# Patient Record
Sex: MALE | Race: WHITE | Employment: FULL TIME | URBAN - NONMETROPOLITAN AREA
[De-identification: names, ages, dates, MRNs, and addresses within clinical notes are randomized per-mention and may not be internally consistent; named-entity substitution may affect disease eponyms.]

---

## 2017-01-16 DIAGNOSIS — E78.00 PURE HYPERCHOLESTEROLEMIA: ICD-10-CM

## 2017-01-16 RX ORDER — FENOFIBRATE 145 MG/1
TABLET, COATED ORAL
Qty: 90 TABLET | Refills: 1 | Status: SHIPPED | OUTPATIENT
Start: 2017-01-16 | End: 2017-08-31 | Stop reason: SDUPTHER

## 2017-04-25 ENCOUNTER — HOSPITAL ENCOUNTER (OUTPATIENT)
Age: 47
Discharge: HOME | End: 2017-04-25
Payer: COMMERCIAL

## 2017-04-25 LAB — PROSTATE SPECIFIC ANTIGEN: 0.31 UG/L

## 2017-04-25 PROCEDURE — 36415 COLL VENOUS BLD VENIPUNCTURE: CPT

## 2017-04-25 PROCEDURE — 84153 ASSAY OF PSA TOTAL: CPT

## 2017-06-26 ENCOUNTER — TELEPHONE (OUTPATIENT)
Dept: FAMILY MEDICINE CLINIC | Age: 47
End: 2017-06-26

## 2017-06-26 DIAGNOSIS — E78.00 PURE HYPERCHOLESTEROLEMIA: Primary | ICD-10-CM

## 2017-08-24 ENCOUNTER — HOSPITAL ENCOUNTER (OUTPATIENT)
Age: 47
Discharge: HOME | End: 2017-08-24
Payer: COMMERCIAL

## 2017-08-24 DIAGNOSIS — E78.00 PURE HYPERCHOLESTEROLEMIA: ICD-10-CM

## 2017-08-24 LAB
ALBUMIN SERPL-MCNC: 4.5 G/DL (ref 3.5–5.2)
ALBUMIN/GLOBULIN RATIO: ABNORMAL (ref 1–2.5)
ALP BLD-CCNC: 52 U/L (ref 40–129)
ALT SERPL-CCNC: 34 U/L (ref 5–41)
ANION GAP SERPL CALCULATED.3IONS-SCNC: 13 MMOL/L (ref 9–17)
AST SERPL-CCNC: 23 U/L
BILIRUB SERPL-MCNC: 0.35 MG/DL (ref 0.3–1.2)
BUN BLDV-MCNC: 24 MG/DL (ref 6–20)
BUN/CREAT BLD: 27 (ref 9–20)
CALCIUM SERPL-MCNC: 9 MG/DL (ref 8.6–10.4)
CHLORIDE BLD-SCNC: 109 MMOL/L (ref 98–107)
CHOLESTEROL/HDL RATIO: 2.8
CHOLESTEROL: 207 MG/DL
CO2: 20 MMOL/L (ref 20–31)
CREAT SERPL-MCNC: 0.89 MG/DL (ref 0.7–1.2)
GFR AFRICAN AMERICAN: >60 ML/MIN
GFR NON-AFRICAN AMERICAN: >60 ML/MIN
GFR SERPL CREATININE-BSD FRML MDRD: ABNORMAL ML/MIN/{1.73_M2}
GFR SERPL CREATININE-BSD FRML MDRD: ABNORMAL ML/MIN/{1.73_M2}
GLUCOSE BLD-MCNC: 96 MG/DL (ref 70–99)
HDLC SERPL-MCNC: 73 MG/DL
LDL CHOLESTEROL: 107 MG/DL (ref 0–130)
PATIENT FASTING?: YES
POTASSIUM SERPL-SCNC: 4 MMOL/L (ref 3.7–5.3)
SODIUM BLD-SCNC: 142 MMOL/L (ref 135–144)
TOTAL PROTEIN: 7.6 G/DL (ref 6.4–8.3)
TRIGL SERPL-MCNC: 133 MG/DL
VLDLC SERPL CALC-MCNC: ABNORMAL MG/DL (ref 1–30)

## 2017-08-24 PROCEDURE — 80061 LIPID PANEL: CPT

## 2017-08-24 PROCEDURE — 80053 COMPREHEN METABOLIC PANEL: CPT

## 2017-08-24 PROCEDURE — 36415 COLL VENOUS BLD VENIPUNCTURE: CPT

## 2017-08-31 ENCOUNTER — OFFICE VISIT (OUTPATIENT)
Dept: FAMILY MEDICINE CLINIC | Age: 47
End: 2017-08-31
Payer: COMMERCIAL

## 2017-08-31 VITALS
WEIGHT: 174 LBS | BODY MASS INDEX: 26.37 KG/M2 | DIASTOLIC BLOOD PRESSURE: 80 MMHG | SYSTOLIC BLOOD PRESSURE: 130 MMHG | HEIGHT: 68 IN

## 2017-08-31 DIAGNOSIS — K21.9 GASTROESOPHAGEAL REFLUX DISEASE WITHOUT ESOPHAGITIS: ICD-10-CM

## 2017-08-31 DIAGNOSIS — E78.00 PURE HYPERCHOLESTEROLEMIA: ICD-10-CM

## 2017-08-31 PROCEDURE — G8427 DOCREV CUR MEDS BY ELIG CLIN: HCPCS | Performed by: FAMILY MEDICINE

## 2017-08-31 PROCEDURE — 1036F TOBACCO NON-USER: CPT | Performed by: FAMILY MEDICINE

## 2017-08-31 PROCEDURE — 99213 OFFICE O/P EST LOW 20 MIN: CPT | Performed by: FAMILY MEDICINE

## 2017-08-31 PROCEDURE — G8419 CALC BMI OUT NRM PARAM NOF/U: HCPCS | Performed by: FAMILY MEDICINE

## 2017-08-31 RX ORDER — LANSOPRAZOLE 30 MG/1
30 CAPSULE, DELAYED RELEASE ORAL DAILY
Qty: 90 CAPSULE | Refills: 1 | Status: SHIPPED | OUTPATIENT
Start: 2017-08-31 | End: 2018-03-01 | Stop reason: SDUPTHER

## 2017-08-31 RX ORDER — FENOFIBRATE 145 MG/1
TABLET, COATED ORAL
Qty: 90 TABLET | Refills: 1 | Status: SHIPPED | OUTPATIENT
Start: 2017-08-31 | End: 2018-03-01 | Stop reason: SDUPTHER

## 2017-08-31 ASSESSMENT — ENCOUNTER SYMPTOMS
CHOKING: 0
COUGH: 0
VOMITING: 0
BELCHING: 0
HEARTBURN: 0
EYE DISCHARGE: 0
NAUSEA: 0
EYE REDNESS: 0
FACIAL SWELLING: 0

## 2018-03-01 ENCOUNTER — OFFICE VISIT (OUTPATIENT)
Dept: FAMILY MEDICINE CLINIC | Age: 48
End: 2018-03-01
Payer: COMMERCIAL

## 2018-03-01 VITALS
DIASTOLIC BLOOD PRESSURE: 84 MMHG | HEIGHT: 69 IN | OXYGEN SATURATION: 98 % | BODY MASS INDEX: 26.81 KG/M2 | HEART RATE: 74 BPM | SYSTOLIC BLOOD PRESSURE: 120 MMHG | WEIGHT: 181 LBS

## 2018-03-01 DIAGNOSIS — E78.00 PURE HYPERCHOLESTEROLEMIA: ICD-10-CM

## 2018-03-01 DIAGNOSIS — K21.9 GASTROESOPHAGEAL REFLUX DISEASE WITHOUT ESOPHAGITIS: ICD-10-CM

## 2018-03-01 PROCEDURE — G8427 DOCREV CUR MEDS BY ELIG CLIN: HCPCS | Performed by: FAMILY MEDICINE

## 2018-03-01 PROCEDURE — G8419 CALC BMI OUT NRM PARAM NOF/U: HCPCS | Performed by: FAMILY MEDICINE

## 2018-03-01 PROCEDURE — 99213 OFFICE O/P EST LOW 20 MIN: CPT | Performed by: FAMILY MEDICINE

## 2018-03-01 PROCEDURE — G8482 FLU IMMUNIZE ORDER/ADMIN: HCPCS | Performed by: FAMILY MEDICINE

## 2018-03-01 PROCEDURE — 1036F TOBACCO NON-USER: CPT | Performed by: FAMILY MEDICINE

## 2018-03-01 RX ORDER — FENOFIBRATE 145 MG/1
TABLET, COATED ORAL
Qty: 90 TABLET | Refills: 3 | Status: SHIPPED | OUTPATIENT
Start: 2018-03-01 | End: 2019-02-14 | Stop reason: SDUPTHER

## 2018-03-01 RX ORDER — LANSOPRAZOLE 30 MG/1
30 CAPSULE, DELAYED RELEASE ORAL DAILY
Qty: 90 CAPSULE | Refills: 3 | Status: SHIPPED | OUTPATIENT
Start: 2018-03-01 | End: 2019-02-14 | Stop reason: SDUPTHER

## 2018-03-01 ASSESSMENT — ENCOUNTER SYMPTOMS
BLOOD IN STOOL: 0
EYE DISCHARGE: 0
EYE REDNESS: 0
COUGH: 0
DIARRHEA: 0
TROUBLE SWALLOWING: 0
ABDOMINAL PAIN: 0
SHORTNESS OF BREATH: 0
VOMITING: 0
NAUSEA: 0
CONSTIPATION: 0

## 2018-03-01 ASSESSMENT — PATIENT HEALTH QUESTIONNAIRE - PHQ9
SUM OF ALL RESPONSES TO PHQ9 QUESTIONS 1 & 2: 0
1. LITTLE INTEREST OR PLEASURE IN DOING THINGS: 0
2. FEELING DOWN, DEPRESSED OR HOPELESS: 0
SUM OF ALL RESPONSES TO PHQ QUESTIONS 1-9: 0

## 2018-03-01 NOTE — PATIENT INSTRUCTIONS
SURVEY:    You may be receiving a survey from [employer] regarding your visit today. Please complete the survey to enable us to provide the highest quality of care to you and your family. If you cannot score us a very good on any question, please call the office to discuss how we could of made your experience a very good one. Thank you.

## 2018-05-22 ENCOUNTER — HOSPITAL ENCOUNTER (OUTPATIENT)
Age: 48
Discharge: HOME | End: 2018-05-22
Payer: COMMERCIAL

## 2018-05-22 LAB — PROSTATE SPECIFIC ANTIGEN: 0.25 UG/L

## 2018-05-22 PROCEDURE — 84153 ASSAY OF PSA TOTAL: CPT

## 2018-05-22 PROCEDURE — 36415 COLL VENOUS BLD VENIPUNCTURE: CPT

## 2018-09-03 ENCOUNTER — HOSPITAL ENCOUNTER (EMERGENCY)
Age: 48
Discharge: HOME | End: 2018-09-03
Attending: FAMILY MEDICINE
Payer: COMMERCIAL

## 2018-09-03 ENCOUNTER — APPOINTMENT (OUTPATIENT)
Dept: CT IMAGING | Age: 48
End: 2018-09-03
Payer: COMMERCIAL

## 2018-09-03 VITALS
HEART RATE: 66 BPM | TEMPERATURE: 98.8 F | BODY MASS INDEX: 25.82 KG/M2 | DIASTOLIC BLOOD PRESSURE: 66 MMHG | RESPIRATION RATE: 14 BRPM | SYSTOLIC BLOOD PRESSURE: 134 MMHG | OXYGEN SATURATION: 99 % | WEIGHT: 174.82 LBS

## 2018-09-03 DIAGNOSIS — R22.0 RIGHT FACIAL SWELLING: ICD-10-CM

## 2018-09-03 DIAGNOSIS — K04.7 DENTAL ABSCESS: Primary | ICD-10-CM

## 2018-09-03 LAB
ABSOLUTE EOS #: 0.1 K/UL (ref 0–0.4)
ABSOLUTE IMMATURE GRANULOCYTE: ABNORMAL K/UL (ref 0–0.3)
ABSOLUTE LYMPH #: 2 K/UL (ref 1–4.8)
ABSOLUTE MONO #: 0.8 K/UL (ref 0–1)
ANION GAP SERPL CALCULATED.3IONS-SCNC: 14 MMOL/L (ref 9–17)
BASOPHILS # BLD: 1 % (ref 0–2)
BASOPHILS ABSOLUTE: 0.1 K/UL (ref 0–0.2)
BUN BLDV-MCNC: 15 MG/DL (ref 6–20)
BUN/CREAT BLD: 18 (ref 9–20)
CALCIUM SERPL-MCNC: 10 MG/DL (ref 8.6–10.4)
CHLORIDE BLD-SCNC: 103 MMOL/L (ref 98–107)
CO2: 24 MMOL/L (ref 20–31)
CREAT SERPL-MCNC: 0.84 MG/DL (ref 0.7–1.2)
DIFFERENTIAL TYPE: YES
EOSINOPHILS RELATIVE PERCENT: 1 % (ref 0–5)
GFR AFRICAN AMERICAN: >60 ML/MIN
GFR NON-AFRICAN AMERICAN: >60 ML/MIN
GFR SERPL CREATININE-BSD FRML MDRD: NORMAL ML/MIN/{1.73_M2}
GFR SERPL CREATININE-BSD FRML MDRD: NORMAL ML/MIN/{1.73_M2}
GLUCOSE BLD-MCNC: 92 MG/DL (ref 70–99)
HCT VFR BLD CALC: 42.2 % (ref 41–53)
HEMOGLOBIN: 14.1 G/DL (ref 13.5–17.5)
IMMATURE GRANULOCYTES: ABNORMAL %
LYMPHOCYTES # BLD: 18 % (ref 13–44)
MCH RBC QN AUTO: 29.2 PG (ref 26–34)
MCHC RBC AUTO-ENTMCNC: 33.3 G/DL (ref 31–37)
MCV RBC AUTO: 87.9 FL (ref 80–100)
MONOCYTES # BLD: 7 % (ref 5–9)
NRBC AUTOMATED: ABNORMAL PER 100 WBC
PDW BLD-RTO: 14.1 % (ref 12.1–15.2)
PLATELET # BLD: 378 K/UL (ref 140–450)
PLATELET ESTIMATE: ABNORMAL
PMV BLD AUTO: ABNORMAL FL (ref 6–12)
POTASSIUM SERPL-SCNC: 3.9 MMOL/L (ref 3.7–5.3)
RBC # BLD: 4.81 M/UL (ref 4.5–5.9)
RBC # BLD: ABNORMAL 10*6/UL
SEG NEUTROPHILS: 73 % (ref 39–75)
SEGMENTED NEUTROPHILS ABSOLUTE COUNT: 8.1 K/UL (ref 2.1–6.5)
SODIUM BLD-SCNC: 141 MMOL/L (ref 135–144)
WBC # BLD: 11.1 K/UL (ref 3.5–11)
WBC # BLD: ABNORMAL 10*3/UL

## 2018-09-03 PROCEDURE — 99283 EMERGENCY DEPT VISIT LOW MDM: CPT

## 2018-09-03 PROCEDURE — 70487 CT MAXILLOFACIAL W/DYE: CPT

## 2018-09-03 PROCEDURE — 85025 COMPLETE CBC W/AUTO DIFF WBC: CPT

## 2018-09-03 PROCEDURE — 80048 BASIC METABOLIC PNL TOTAL CA: CPT

## 2018-09-03 PROCEDURE — 6360000004 HC RX CONTRAST MEDICATION: Performed by: FAMILY MEDICINE

## 2018-09-03 RX ORDER — ACETAMINOPHEN 500 MG
1000 TABLET ORAL EVERY 6 HOURS PRN
COMMUNITY

## 2018-09-03 RX ORDER — CLINDAMYCIN HYDROCHLORIDE 150 MG/1
150 CAPSULE ORAL 3 TIMES DAILY
COMMUNITY
End: 2019-02-14 | Stop reason: ALTCHOICE

## 2018-09-03 RX ORDER — IBUPROFEN 200 MG
800 TABLET ORAL EVERY 6 HOURS PRN
COMMUNITY

## 2018-09-03 RX ADMIN — IOPAMIDOL 75 ML: 755 INJECTION, SOLUTION INTRAVENOUS at 15:51

## 2018-09-03 ASSESSMENT — PAIN DESCRIPTION - DESCRIPTORS: DESCRIPTORS: ACHING

## 2018-09-03 ASSESSMENT — PAIN DESCRIPTION - FREQUENCY: FREQUENCY: CONTINUOUS

## 2018-09-03 ASSESSMENT — PAIN DESCRIPTION - LOCATION: LOCATION: FACE

## 2018-09-03 ASSESSMENT — PAIN DESCRIPTION - PAIN TYPE: TYPE: ACUTE PAIN

## 2018-09-03 ASSESSMENT — PAIN SCALES - GENERAL: PAINLEVEL_OUTOF10: 4

## 2018-09-03 NOTE — LETTER
[facility] ED  [address] [address]  Phone: [phone]               September 3, 2018    Patient: Alberto Hopkins   YOB: 1970   Date of Visit: 9/3/2018       To Whom It May Concern:    James Turner was seen and treated in our emergency department on 9/3/2018. He may return to work on 09/05/2018.       Sincerely,       James Turner RN         Signature:__________________________________

## 2018-09-04 NOTE — ED PROVIDER NOTES
that the status of his mother is unknown. He indicated that the status of his father is unknown. He indicated that the status of his brother is unknown.    family history includes Cancer in his father and mother; Diabetes in his mother; Heart Disease in his father; High Blood Pressure in his mother; High Cholesterol in his brother. SOCIAL HISTORY      reports that he quit smoking about 11 years ago. He has never used smokeless tobacco.    PHYSICAL EXAM     INITIAL VITALS:  weight is 174 lb 13.2 oz (79.3 kg). His temperature is 98.8 °F (37.1 °C). His blood pressure is 134/66 and his pulse is 66. His respiration is 14 and oxygen saturation is 99%. Physical Exam   Constitutional: Patient is oriented to person, place, and time. Patient appears well-developed and well-nourished. Patient is active and cooperative. HENT:   Head: Normocephalic and atraumatic. Head is without contusion. There is noted swelling affecting the patient's right cheek, without overlying integument aberration. Right Ear: Hearing and external ear normal. No drainage. Left Ear: Hearing and external ear normal. No drainage. Nose: Nose normal. No nasal deformity. No epistaxis. Mouth/Throat: Mucous membranes are not dry. Mild tenderness to palpation of the incisor teeth medially, without erythema or gum, no gross abscess  Eyes: EOMI. Conjunctivae, sclera, and lids are normal. Right eye exhibits no discharge. Left eye exhibits no discharge. Neck: Full passive range of motion without pain and phonation normal.   Cardiovascular:  Normal rate, regular rhythm and intact distal pulses. Pulses: Right radial pulse  2+   Pulmonary/Chest: Effort normal. No tachypnea and no bradypnea. Abdominal: Soft. Patient without distension  Musculoskeletal:   Negative acute trauma or deformity,  apparent full range of motion and normal strength all extremities appropriate to age.   Neurological: Patient is alert and oriented to person, place, and Motrin and Tylenol for pain and fever, patient strongly advised to keep his stated appointment with his dentist this evening for definitive treatment of dental abscess, patient acknowledges    FINAL IMPRESSION      1. Dental abscess    2. Right facial swelling          DISPOSITION/PLAN   Discharge    PATIENT REFERRED TO:  Dr. Rosanne Robbins, Dentist  [facility]  [address] [address]  [phone]    If symptoms worsen, As needed      DISCHARGE MEDICATIONS:  Discharge Medication List as of 9/3/2018  4:33 PM              Summation      Patient Course:  Discharge    ED Medications administered this visit:    Medications   iopamidol (ISOVUE-370) 76 % injection 75 mL (75 mLs Intravenous Given 9/3/18 1551)       New Prescriptions from this visit:    Discharge Medication List as of 9/3/2018  4:33 PM          Follow-up:  Dr. Rosanne Robbins, Dentist  Follow up at stated [facility] ED  [address] [address]  [phone]    If symptoms worsen, As needed        Final Impression:   1. Dental abscess    2.  Right facial swelling               (Please note that portions of this note were completed with a voice recognition program.  Efforts were made to edit the dictations but occasionally words are mis-transcribed.)    MD Werner Randle MD  09/04/18 5550

## 2018-10-26 ENCOUNTER — TELEPHONE (OUTPATIENT)
Dept: FAMILY MEDICINE CLINIC | Age: 48
End: 2018-10-26

## 2018-10-26 RX ORDER — PREDNISONE 20 MG/1
TABLET ORAL
Qty: 21 TABLET | Refills: 0 | Status: SHIPPED | OUTPATIENT
Start: 2018-10-26 | End: 2019-02-14 | Stop reason: ALTCHOICE

## 2019-01-09 LAB
CHOLESTEROL, TOTAL: 240 MG/DL
CHOLESTEROL/HDL RATIO: 3
HDLC SERPL-MCNC: 79 MG/DL (ref 35–70)
LDL CHOLESTEROL CALCULATED: 139 MG/DL (ref 0–160)
TRIGL SERPL-MCNC: 112 MG/DL
VLDLC SERPL CALC-MCNC: ABNORMAL MG/DL

## 2019-02-14 ENCOUNTER — OFFICE VISIT (OUTPATIENT)
Dept: FAMILY MEDICINE CLINIC | Age: 49
End: 2019-02-14
Payer: COMMERCIAL

## 2019-02-14 VITALS
OXYGEN SATURATION: 98 % | WEIGHT: 179 LBS | BODY MASS INDEX: 26.43 KG/M2 | SYSTOLIC BLOOD PRESSURE: 124 MMHG | DIASTOLIC BLOOD PRESSURE: 82 MMHG | HEART RATE: 90 BPM

## 2019-02-14 DIAGNOSIS — R06.83 SNORING: ICD-10-CM

## 2019-02-14 DIAGNOSIS — R53.83 OTHER FATIGUE: ICD-10-CM

## 2019-02-14 DIAGNOSIS — K21.9 GASTROESOPHAGEAL REFLUX DISEASE WITHOUT ESOPHAGITIS: ICD-10-CM

## 2019-02-14 DIAGNOSIS — E78.00 PURE HYPERCHOLESTEROLEMIA: Primary | ICD-10-CM

## 2019-02-14 PROCEDURE — 1036F TOBACCO NON-USER: CPT | Performed by: FAMILY MEDICINE

## 2019-02-14 PROCEDURE — 99213 OFFICE O/P EST LOW 20 MIN: CPT | Performed by: FAMILY MEDICINE

## 2019-02-14 PROCEDURE — G8419 CALC BMI OUT NRM PARAM NOF/U: HCPCS | Performed by: FAMILY MEDICINE

## 2019-02-14 PROCEDURE — G8484 FLU IMMUNIZE NO ADMIN: HCPCS | Performed by: FAMILY MEDICINE

## 2019-02-14 PROCEDURE — G8427 DOCREV CUR MEDS BY ELIG CLIN: HCPCS | Performed by: FAMILY MEDICINE

## 2019-02-14 RX ORDER — FENOFIBRATE 145 MG/1
TABLET, COATED ORAL
Qty: 90 TABLET | Refills: 3 | Status: SHIPPED | OUTPATIENT
Start: 2019-02-14 | End: 2020-02-06 | Stop reason: SDUPTHER

## 2019-02-14 RX ORDER — LANSOPRAZOLE 30 MG/1
30 CAPSULE, DELAYED RELEASE ORAL DAILY
Qty: 90 CAPSULE | Refills: 3 | Status: SHIPPED | OUTPATIENT
Start: 2019-02-14 | End: 2020-02-06 | Stop reason: SDUPTHER

## 2019-02-14 ASSESSMENT — ENCOUNTER SYMPTOMS
EYE DISCHARGE: 0
COUGH: 0
VOMITING: 0
HOARSE VOICE: 0
SHORTNESS OF BREATH: 0
DIARRHEA: 0
HEARTBURN: 0
BLOOD IN STOOL: 0
FACIAL SWELLING: 0
EYE REDNESS: 0

## 2019-02-14 ASSESSMENT — PATIENT HEALTH QUESTIONNAIRE - PHQ9
SUM OF ALL RESPONSES TO PHQ QUESTIONS 1-9: 0
2. FEELING DOWN, DEPRESSED OR HOPELESS: 0
SUM OF ALL RESPONSES TO PHQ QUESTIONS 1-9: 0
1. LITTLE INTEREST OR PLEASURE IN DOING THINGS: 0
SUM OF ALL RESPONSES TO PHQ9 QUESTIONS 1 & 2: 0

## 2019-05-10 ENCOUNTER — HOSPITAL ENCOUNTER (OUTPATIENT)
Age: 49
Discharge: HOME | End: 2019-05-10
Payer: COMMERCIAL

## 2019-05-10 PROCEDURE — 36415 COLL VENOUS BLD VENIPUNCTURE: CPT

## 2019-05-10 PROCEDURE — 84154 ASSAY OF PSA FREE: CPT

## 2019-05-14 LAB
PROSTATE SPECIFIC ANTIGEN FREE: <0.1 UG/L
PROSTATE SPECIFIC ANTIGEN PERCENT FREE: 20 %
PROSTATE SPECIFIC ANTIGEN: 0.2 UG/L (ref 0–4)

## 2019-06-19 ENCOUNTER — HOSPITAL ENCOUNTER (OUTPATIENT)
Dept: GENERAL RADIOLOGY | Age: 49
Discharge: HOME | End: 2019-06-21
Payer: COMMERCIAL

## 2019-06-19 ENCOUNTER — HOSPITAL ENCOUNTER (OUTPATIENT)
Age: 49
Discharge: HOME | End: 2019-06-21
Payer: COMMERCIAL

## 2019-06-19 DIAGNOSIS — M17.11 OSTEOARTHRITIS OF RIGHT KNEE, UNSPECIFIED OSTEOARTHRITIS TYPE: ICD-10-CM

## 2019-06-19 PROCEDURE — 73562 X-RAY EXAM OF KNEE 3: CPT

## 2020-01-08 ENCOUNTER — HOSPITAL ENCOUNTER (OUTPATIENT)
Age: 50
Discharge: HOME | End: 2020-01-10
Payer: COMMERCIAL

## 2020-01-08 ENCOUNTER — HOSPITAL ENCOUNTER (OUTPATIENT)
Dept: GENERAL RADIOLOGY | Age: 50
Discharge: HOME | End: 2020-01-10
Payer: COMMERCIAL

## 2020-01-08 PROCEDURE — 73564 X-RAY EXAM KNEE 4 OR MORE: CPT

## 2020-01-14 ENCOUNTER — OFFICE VISIT (OUTPATIENT)
Dept: FAMILY MEDICINE CLINIC | Age: 50
End: 2020-01-14
Payer: COMMERCIAL

## 2020-01-14 VITALS
HEIGHT: 69 IN | BODY MASS INDEX: 26.66 KG/M2 | SYSTOLIC BLOOD PRESSURE: 124 MMHG | OXYGEN SATURATION: 98 % | WEIGHT: 180 LBS | TEMPERATURE: 98.1 F | DIASTOLIC BLOOD PRESSURE: 80 MMHG | HEART RATE: 74 BPM

## 2020-01-14 PROCEDURE — 99213 OFFICE O/P EST LOW 20 MIN: CPT | Performed by: NURSE PRACTITIONER

## 2020-01-14 RX ORDER — TIZANIDINE 4 MG/1
4 TABLET ORAL NIGHTLY PRN
Qty: 30 TABLET | Refills: 0 | Status: SHIPPED | OUTPATIENT
Start: 2020-01-14 | End: 2021-06-07 | Stop reason: ALTCHOICE

## 2020-01-14 ASSESSMENT — ENCOUNTER SYMPTOMS
VOMITING: 0
NAUSEA: 0
DIARRHEA: 0
SHORTNESS OF BREATH: 0
COUGH: 0

## 2020-01-14 NOTE — PROGRESS NOTES
No orders of the defined types were placed in this encounter. Patient Instructions     SURVEY:    You may be receiving a survey from [employer] regarding your visit today. Please complete the survey to enable us to provide the highest quality of care to you and your family. If you cannot score us a very good (5 Stars) on any question, please call the office to discuss how we could have made your experience a very good one. Thank you. Clinical Care Team: TITA Aleman-MILLY Suggs LPN    Clerical Team: Lynda Truong    Patient Education        Neck Strain or Sprain: Rehab Exercises  Introduction  Here are some examples of exercises for you to try. The exercises may be suggested for a condition or for rehabilitation. Start each exercise slowly. Ease off the exercises if you start to have pain. You will be told when to start these exercises and which ones will work best for you. How to do the exercises  Neck rotation   1. Sit in a firm chair, or stand up straight. 2. Keeping your chin level, turn your head to the right, and hold for 15 to 30 seconds. 3. Turn your head to the left and hold for 15 to 30 seconds. 4. Repeat 2 to 4 times to each side. Neck stretches   1. Look straight ahead, and tip your right ear to your right shoulder. Do not let your left shoulder rise up as you tip your head to the right. 2. Hold for 15 to 30 seconds. 3. Tilt your head to the left. Do not let your right shoulder rise up as you tip your head to the left. 4. Hold for 15 to 30 seconds. 5. Repeat 2 to 4 times to each side. Forward neck flexion   1. Sit in a firm chair, or stand up straight. 2. Bend your head forward. 3. Hold for 15 to 30 seconds. 4. Repeat 2 to 4 times. Lateral (side) bend strengthening   1.  With your right hand, place your first two fingers on your right information. Patient Education        Neck: Exercises  Introduction  Here are some examples of exercises for you to try. The exercises may be suggested for a condition or for rehabilitation. Start each exercise slowly. Ease off the exercises if you start to have pain. You will be told when to start these exercises and which ones will work best for you. How to do the exercises  Neck stretch   1. This stretch works best if you keep your shoulder down as you lean away from it. To help you remember to do this, start by relaxing your shoulders and lightly holding on to your thighs or your chair. 2. Tilt your head toward your shoulder and hold for 15 to 30 seconds. Let the weight of your head stretch your muscles. 3. If you would like a little added stretch, use your hand to gently and steadily pull your head toward your shoulder. For example, keeping your right shoulder down, lean your head to the left. 4. Repeat 2 to 4 times toward each shoulder. Diagonal neck stretch   1. Turn your head slightly toward the direction you will be stretching, and tilt your head diagonally toward your chest and hold for 15 to 30 seconds. 2. If you would like a little added stretch, use your hand to gently and steadily pull your head forward on the diagonal.  3. Repeat 2 to 4 times toward each side. Dorsal glide stretch   1. Sit or stand tall and look straight ahead. 2. Slowly tuck your chin as you glide your head backward over your body  3. Hold for a count of 6, and then relax for up to 10 seconds. 4. Repeat 8 to 12 times. Chest and shoulder stretch   1. Sit or stand tall and glide your head backward as in the dorsal glide stretch. 2. Raise both arms so that your hands are next to your ears. 3. Take a deep breath, and as you breathe out, lower your elbows down and behind your back.  You will feel your shoulder blades slide down and together, and at the same time you will feel a stretch across your chest and the front of your shoulders. 4. Hold for about 6 seconds, and then relax for up to 10 seconds. 5. Repeat 8 to 12 times. Strengthening: Hands on head   1. Move your head backward, forward, and side to side against gentle pressure from your hands, holding each position for about 6 seconds. 2. Repeat 8 to 12 times. Follow-up care is a key part of your treatment and safety. Be sure to make and go to all appointments, and call your doctor if you are having problems. It's also a good idea to know your test results and keep a list of the medicines you take. Where can you learn more? Go to https://ideaForgepePFSwebeb.Emotient. org and sign in to your [employer] account. Enter P975 in the [employer] box to learn more about \"Neck: Exercises. \"     If you do not have an account, please click on the \"Sign Up Now\" link. Current as of: June 26, 2019  Content Version: 12.3  © 0024-6527 Healthwise, Incorporated. Care instructions adapted under license by [facility] ([facility]). If you have questions about a medical condition or this instruction, always ask your healthcare professional. [place] [address] any warranty or liability for your use of this information. Electronically signed by TITA Abdullahi CNP on 1/14/2020 at 6:24 AM           Completed Refills      Requested Prescriptions     Signed Prescriptions Disp Refills    tiZANidine (ZANAFLEX) 4 MG tablet 30 tablet 0     Sig: Take 1 tablet by mouth nightly as needed (neck pain)         Emma Salazar received counseling on the following healthy behaviors: medication adherence  Reviewed prior labs and health maintenance. Continue current medications, diet and exercise. Discussed use, benefit, and side effects of prescribed medications. Barriers to medication compliance addressed. Patient given educational materials - see patient instructions. All patient questions answered. Patient voiced understanding.

## 2020-01-14 NOTE — PATIENT INSTRUCTIONS
keep your head from bending. 3. Hold for about 6 seconds. 4. Repeat 8 to 12 times. 5. Switch hands and repeat the same exercise on your left side. Forward bend strengthening   1. Place your first two fingers of either hand on your forehead. 2. Start to bend your head forward while using gentle pressure from your fingers to keep your head from bending. 3. Hold for about 6 seconds. 4. Repeat 8 to 12 times. Neutral position strengthening   1. Using one hand, place your fingertips on the back of your head at the top of your neck. 2. Start to bend your head backward while using gentle pressure from your fingers to keep your head from bending. 3. Hold for about 6 seconds. 4. Repeat 8 to 12 times. Chin tuck   1. Lie on the floor with a rolled-up towel under your neck. Your head should be touching the floor. 2. Slowly bring your chin toward your chest.  3. Hold for a count of 6, and then relax for up to 10 seconds. 4. Repeat 8 to 12 times. Follow-up care is a key part of your treatment and safety. Be sure to make and go to all appointments, and call your doctor if you are having problems. It's also a good idea to know your test results and keep a list of the medicines you take. Where can you learn more? Go to https://[employer].Wiki-PR. org and sign in to your [employer] account. Enter M679 in the [facility] box to learn more about \"Neck Strain or Sprain: Rehab Exercises. \"     If you do not have an account, please click on the \"Sign Up Now\" link. Current as of: June 26, 2019  Content Version: 12.3  © 5413-8062 Healthwise, Incorporated. Care instructions adapted under license by [facility] ([facility]). If you have questions about a medical condition or this instruction, always ask your healthcare professional. [place] [address] any warranty or liability for your use of this information.          Patient Education        Neck: Exercises  Introduction  Here are some examples seconds. 5. Repeat 8 to 12 times. Strengthening: Hands on head   1. Move your head backward, forward, and side to side against gentle pressure from your hands, holding each position for about 6 seconds. 2. Repeat 8 to 12 times. Follow-up care is a key part of your treatment and safety. Be sure to make and go to all appointments, and call your doctor if you are having problems. It's also a good idea to know your test results and keep a list of the medicines you take. Where can you learn more? Go to https://Blue Lane TechnologiespeRecondo.EverybodyCar. org and sign in to your [employer] account. Enter P975 in the [employer] box to learn more about \"Neck: Exercises. \"     If you do not have an account, please click on the \"Sign Up Now\" link. Current as of: June 26, 2019  Content Version: 12.3  © 9053-9359 Healthwise, Incorporated. Care instructions adapted under license by [facility] ([facility]). If you have questions about a medical condition or this instruction, always ask your healthcare professional. [address] any warranty or liability for your use of this information.

## 2020-01-21 LAB
CHOLESTEROL, TOTAL: 228 MG/DL
CHOLESTEROL/HDL RATIO: 3.08
HDLC SERPL-MCNC: 74 MG/DL (ref 35–70)
LDL CHOLESTEROL CALCULATED: 117 MG/DL (ref 0–160)
TRIGL SERPL-MCNC: 187 MG/DL
VLDLC SERPL CALC-MCNC: 37 MG/DL

## 2020-02-06 ENCOUNTER — OFFICE VISIT (OUTPATIENT)
Dept: FAMILY MEDICINE CLINIC | Age: 50
End: 2020-02-06
Payer: COMMERCIAL

## 2020-02-06 VITALS
HEART RATE: 88 BPM | BODY MASS INDEX: 25.99 KG/M2 | OXYGEN SATURATION: 98 % | WEIGHT: 176 LBS | SYSTOLIC BLOOD PRESSURE: 130 MMHG | DIASTOLIC BLOOD PRESSURE: 84 MMHG

## 2020-02-06 PROCEDURE — G8427 DOCREV CUR MEDS BY ELIG CLIN: HCPCS | Performed by: FAMILY MEDICINE

## 2020-02-06 PROCEDURE — 1036F TOBACCO NON-USER: CPT | Performed by: FAMILY MEDICINE

## 2020-02-06 PROCEDURE — G8419 CALC BMI OUT NRM PARAM NOF/U: HCPCS | Performed by: FAMILY MEDICINE

## 2020-02-06 PROCEDURE — G8484 FLU IMMUNIZE NO ADMIN: HCPCS | Performed by: FAMILY MEDICINE

## 2020-02-06 PROCEDURE — 99213 OFFICE O/P EST LOW 20 MIN: CPT | Performed by: FAMILY MEDICINE

## 2020-02-06 RX ORDER — LANSOPRAZOLE 30 MG/1
30 CAPSULE, DELAYED RELEASE ORAL DAILY
Qty: 90 CAPSULE | Refills: 3 | Status: SHIPPED | OUTPATIENT
Start: 2020-02-06 | End: 2021-01-29 | Stop reason: SDUPTHER

## 2020-02-06 RX ORDER — FENOFIBRATE 145 MG/1
TABLET, COATED ORAL
Qty: 90 TABLET | Refills: 3 | Status: SHIPPED | OUTPATIENT
Start: 2020-02-06 | End: 2021-01-29 | Stop reason: SDUPTHER

## 2020-02-06 RX ORDER — NAPROXEN SODIUM 220 MG
220 TABLET ORAL PRN
COMMUNITY
End: 2021-07-14

## 2020-02-06 SDOH — ECONOMIC STABILITY: INCOME INSECURITY: HOW HARD IS IT FOR YOU TO PAY FOR THE VERY BASICS LIKE FOOD, HOUSING, MEDICAL CARE, AND HEATING?: NOT HARD AT ALL

## 2020-02-06 SDOH — ECONOMIC STABILITY: FOOD INSECURITY: WITHIN THE PAST 12 MONTHS, THE FOOD YOU BOUGHT JUST DIDN'T LAST AND YOU DIDN'T HAVE MONEY TO GET MORE.: NEVER TRUE

## 2020-02-06 SDOH — ECONOMIC STABILITY: FOOD INSECURITY: WITHIN THE PAST 12 MONTHS, YOU WORRIED THAT YOUR FOOD WOULD RUN OUT BEFORE YOU GOT MONEY TO BUY MORE.: NEVER TRUE

## 2020-02-06 ASSESSMENT — PATIENT HEALTH QUESTIONNAIRE - PHQ9
SUM OF ALL RESPONSES TO PHQ9 QUESTIONS 1 & 2: 0
1. LITTLE INTEREST OR PLEASURE IN DOING THINGS: 0
SUM OF ALL RESPONSES TO PHQ QUESTIONS 1-9: 0
2. FEELING DOWN, DEPRESSED OR HOPELESS: 0
SUM OF ALL RESPONSES TO PHQ QUESTIONS 1-9: 0

## 2020-02-06 ASSESSMENT — ENCOUNTER SYMPTOMS
SHORTNESS OF BREATH: 0
SORE THROAT: 0
CHOKING: 0
COUGH: 0
WHEEZING: 0

## 2020-02-06 NOTE — PATIENT INSTRUCTIONS
SURVEY:    You may be receiving a survey from [employer] regarding your visit today. Please complete the survey to enable us to provide the highest quality of care to you and your family. If you cannot score us a very good on any question, please call the office to discuss how we could have made your experience a very good one. Thank you.

## 2020-02-06 NOTE — PROGRESS NOTES
HPI Notes    Name: Dedra Dominguez  : 1970        Chief Complaint:     Chief Complaint   Patient presents with    Hyperlipidemia    Gastroesophageal Reflux       History of Present Illness:     Dedra Dominguez is a 53 y.o.  male who presents with Hyperlipidemia and Gastroesophageal Reflux      Hyperlipidemia   This is a chronic problem. The current episode started more than 1 year ago. The problem is controlled. Recent lipid tests were reviewed and are normal. He has no history of diabetes or hypothyroidism. Pertinent negatives include no focal weakness or shortness of breath. Current antihyperlipidemic treatment includes fibric acid derivatives. The current treatment provides significant improvement of lipids. Gastroesophageal Reflux   He reports no choking, no coughing, no dysphagia, no sore throat or no wheezing. heartburn only if he forgets to take the prevacid. . This is a chronic problem. The current episode started more than 1 year ago. The problem has been unchanged. Pertinent negatives include no melena or weight loss. He has tried a PPI (pt takes prevacid daily) for the symptoms. The treatment provided significant relief. Past Medical History:     Past Medical History:   Diagnosis Date    Hyperlipidemia     Kidney stones       Reviewed all health maintenance requirements and ordered appropriate tests  Health Maintenance Due   Topic Date Due    DTaP/Tdap/Td vaccine (1 - Tdap) 01/10/1982    HIV screen  01/10/1986       Past Surgical History:     No past surgical history on file. Medications:       Prior to Admission medications    Medication Sig Start Date End Date Taking?  Authorizing Provider   naproxen sodium (ALEVE) 220 MG tablet Take 220 mg by mouth as needed for Pain   Yes Historical Provider, MD   lansoprazole (PREVACID) 30 MG delayed release capsule Take 1 capsule by mouth daily 20  Yes Jessica Pantoja MD   fenofibrate (TRICOR) 145 MG tablet TAKE 1 TABLET DAILY 2/6/20  Yes Davie Castillo MD   tiZANidine (ZANAFLEX) 4 MG tablet Take 1 tablet by mouth nightly as needed (neck pain) 1/14/20  Yes TITA Burrell CNP   ibuprofen (ADVIL;MOTRIN) 200 MG tablet Take 800 mg by mouth every 6 hours as needed for Pain   Yes Historical Provider, MD   acetaminophen (TYLENOL) 500 MG tablet Take 1,000 mg by mouth every 6 hours as needed for Pain    Historical Provider, MD        Allergies:       Patient has no known allergies. Social History:     Tobacco:    reports that he quit smoking about 12 years ago. He has never used smokeless tobacco.  Alcohol:      has no history on file for alcohol. Drug Use:  has no history on file for drug. Family History:     Family History   Problem Relation Age of Onset    Diabetes Mother     Cancer Mother         brreast    High Blood Pressure Mother     Cancer Father         colon    Heart Disease Father     High Cholesterol Brother        Review of Systems:       Review of Systems   Constitutional: Negative for weight loss. HENT: Negative for sore throat. Respiratory: Negative for cough, choking, shortness of breath and wheezing. Gastrointestinal: Negative for dysphagia and melena. Neurological: Negative for focal weakness. Physical Exam:     Physical Exam  Vitals signs reviewed. Constitutional:       Appearance: He is well-developed. HENT:      Head: Normocephalic and atraumatic. Eyes:      General:         Right eye: No discharge. Left eye: No discharge. Conjunctiva/sclera: Conjunctivae normal.      Pupils: Pupils are equal, round, and reactive to light. Neck:      Musculoskeletal: Neck supple. Thyroid: No thyromegaly. Cardiovascular:      Rate and Rhythm: Normal rate and regular rhythm. Heart sounds: No murmur. Pulmonary:      Effort: Pulmonary effort is normal. No respiratory distress. Breath sounds: Normal breath sounds. No rhonchi.    Abdominal:      General: Bowel

## 2020-08-05 ENCOUNTER — HOSPITAL ENCOUNTER (OUTPATIENT)
Age: 50
Discharge: HOME | End: 2020-08-07
Payer: COMMERCIAL

## 2020-08-05 ENCOUNTER — HOSPITAL ENCOUNTER (OUTPATIENT)
Dept: GENERAL RADIOLOGY | Age: 50
Discharge: HOME | End: 2020-08-07
Payer: COMMERCIAL

## 2020-08-05 PROCEDURE — 73564 X-RAY EXAM KNEE 4 OR MORE: CPT

## 2020-10-08 ENCOUNTER — HOSPITAL ENCOUNTER (OUTPATIENT)
Age: 50
Setting detail: SPECIMEN
Discharge: HOME | End: 2020-10-08
Payer: COMMERCIAL

## 2020-10-08 LAB
SARS-COV-2, RAPID: NOT DETECTED
SARS-COV-2: NORMAL
SARS-COV-2: NORMAL
SOURCE: NORMAL

## 2020-10-08 PROCEDURE — U0002 COVID-19 LAB TEST NON-CDC: HCPCS

## 2020-10-08 PROCEDURE — C9803 HOPD COVID-19 SPEC COLLECT: HCPCS

## 2020-12-11 ENCOUNTER — TELEPHONE (OUTPATIENT)
Dept: PRIMARY CARE CLINIC | Age: 50
End: 2020-12-11

## 2020-12-11 NOTE — TELEPHONE ENCOUNTER
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 Pt ok with waiting for order and being tested on Monday 12/14/2020. Pt has NO symptoms.

## 2020-12-12 ENCOUNTER — HOSPITAL ENCOUNTER (OUTPATIENT)
Dept: PREADMISSION TESTING | Age: 50
Setting detail: SPECIMEN
Discharge: HOME | End: 2020-12-12
Payer: COMMERCIAL

## 2020-12-12 PROCEDURE — U0003 INFECTIOUS AGENT DETECTION BY NUCLEIC ACID (DNA OR RNA); SEVERE ACUTE RESPIRATORY SYNDROME CORONAVIRUS 2 (SARS-COV-2) (CORONAVIRUS DISEASE [COVID-19]), AMPLIFIED PROBE TECHNIQUE, MAKING USE OF HIGH THROUGHPUT TECHNOLOGIES AS DESCRIBED BY CMS-2020-01-R: HCPCS

## 2020-12-12 PROCEDURE — C9803 HOPD COVID-19 SPEC COLLECT: HCPCS

## 2020-12-15 LAB — SARS-COV-2, NAA: DETECTED

## 2020-12-16 ENCOUNTER — TELEPHONE (OUTPATIENT)
Dept: ADMINISTRATIVE | Age: 50
End: 2020-12-16

## 2020-12-28 ENCOUNTER — PATIENT MESSAGE (OUTPATIENT)
Dept: FAMILY MEDICINE CLINIC | Age: 50
End: 2020-12-28

## 2020-12-28 NOTE — TELEPHONE ENCOUNTER
From: Marlon Garcia  To: Courtney Arellano MD  Sent: 12/28/2020 10:14 AM EST  Subject: Non-Urgent Medical Question    Good morning,    My employer is requesting a return to work letter. The health released my December 18th, is there a way you could write the note and either email it to me or I can pick it up.  Email is [email or web address].

## 2020-12-29 ENCOUNTER — PATIENT MESSAGE (OUTPATIENT)
Dept: FAMILY MEDICINE CLINIC | Age: 50
End: 2020-12-29

## 2020-12-29 NOTE — TELEPHONE ENCOUNTER
Please write a letter and then email to the patient saying he is ok to Return to work on 12/18/20. One of the girls may know my .name phrase to sign it for me.  thanks

## 2020-12-30 ENCOUNTER — PATIENT MESSAGE (OUTPATIENT)
Dept: FAMILY MEDICINE CLINIC | Age: 50
End: 2020-12-30

## 2020-12-30 NOTE — TELEPHONE ENCOUNTER
From: Horacio Goss  To: Ivan Banerjee MD  Sent: 12/29/2020 8:30 PM EST  Subject: Non-Urgent Medical Question    Homero Morales,     My apologies for whatever reason I put in the wrong date, the health dept released me on 12/22/2020 but I was then on vacation so my back to work date is 1/4/2021. Would you mind resending with the date 1/4/2021 that way work will accept it? Thank you,  Brianna Rubin      ----- Message -----   From:MIGUEL Edwards   Sent:12/29/2020 12:49 PM EST   To:Zacarias Rachel   Subject:RE: Non-Urgent [facility],  Dr Candice Porter just gave the okay for a work note. I will type it up and Email it to you. Thank you,  Homero Morales      ----- Message -----   From:Zacarias Mcmahan   Sent:12/28/2020 10:14 AM EST   To:Aidee Hunter MD   Subject:Non-Urgent Medical Question    Good morning,    My employer is requesting a return to work letter. The health released my December 18th, is there a way you could write the note and either email it to me or I can pick it up.  Email is [email or web address].

## 2020-12-30 NOTE — TELEPHONE ENCOUNTER
From: Lillie Michael  To: Rica Lemos MD  Sent: 12/30/2020 7:39 AM EST  Subject: Non-Urgent Medical Question    Good morning,    Ok if you could put go back to work on 12/23/2020 that would be great. I think you could confirm with health department they said I was released after 12/22/2020.      ----- Message -----   From:MA Jacinta Smith   Sent:12/30/2020 6:27 AM EST   To:Zacarias Mcmahan    Subject:RE: Non-Urgent Medical Question    Hi Ed! We can give you a letter to release you on 12/22/20. We would not give you a letter for your vacation time. ----- Message -----   From:Zacarias Mcamhan   Sent:12/29/2020 8:30 PM EST   To:Aidee Zimmer Mc, MD   Subject:Non-Urgent Medical Question    Debra,     My apologies for whatever reason I put in the wrong date, the health dept released me on 12/22/2020 but I was then on vacation so my back to work date is 1/4/2021. Would you mind resending with the date 1/4/2021 that way work will accept it? Thank you,  Calvin Knapp      ----- Message -----   From:MA Kalli Cortes   Sent:12/29/2020 12:49 PM EST   To:Zacarias Cowan   Subject:RE: Non-Urgent [facility],  Dr Mikel Reyes just gave the okay for a work note. I will type it up and Email it to you. Thank you,  Debra      ----- Message -----   From:Zacarias Mcmahan   Sent:12/28/2020 10:14 AM EST   To:Aidee Zimmer Mc, MD   Subject:Non-Urgent Medical Question    Good morning,    My employer is requesting a return to work letter. The health released my December 18th, is there a way you could write the note and either email it to me or I can pick it up.  Email is [email or web address].

## 2021-03-04 ENCOUNTER — HOSPITAL ENCOUNTER (EMERGENCY)
Age: 51
Discharge: HOME | End: 2021-03-04
Attending: INTERNAL MEDICINE | Admitting: INTERNAL MEDICINE
Payer: COMMERCIAL

## 2021-03-04 VITALS
DIASTOLIC BLOOD PRESSURE: 108 MMHG | HEART RATE: 85 BPM | TEMPERATURE: 98 F | OXYGEN SATURATION: 97 % | RESPIRATION RATE: 16 BRPM | SYSTOLIC BLOOD PRESSURE: 157 MMHG | WEIGHT: 170 LBS | BODY MASS INDEX: 25.1 KG/M2

## 2021-03-04 DIAGNOSIS — I10 ESSENTIAL HYPERTENSION: ICD-10-CM
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
## 2021-03-04 DIAGNOSIS — Z23 NEED FOR TDAP VACCINATION: ICD-10-CM

## 2021-03-04 PROCEDURE — 6370000000 HC RX 637 (ALT 250 FOR IP): Performed by: INTERNAL MEDICINE

## 2021-03-04 PROCEDURE — 6360000002 HC RX W HCPCS: Performed by: INTERNAL MEDICINE

## 2021-03-04 PROCEDURE — 90715 TDAP VACCINE 7 YRS/> IM: CPT | Performed by: INTERNAL MEDICINE

## 2021-03-04 PROCEDURE — 99284 EMERGENCY DEPT VISIT MOD MDM: CPT

## 2021-03-04 PROCEDURE — 90471 IMMUNIZATION ADMIN: CPT | Performed by: INTERNAL MEDICINE

## 2021-03-04 RX ORDER — DIAPER,BRIEF,INFANT-TODD,DISP
EACH MISCELLANEOUS ONCE
Status: COMPLETED | OUTPATIENT
Start: 2021-03-04 | End: 2021-03-04

## 2021-03-04 RX ORDER — AMOXICILLIN AND CLAVULANATE POTASSIUM 875; 125 MG/1; MG/1
1 TABLET, FILM COATED ORAL 2 TIMES DAILY
Qty: 20 TABLET | Refills: 0 | Status: SHIPPED | OUTPATIENT
Start: 2021-03-04 | End: 2021-03-14

## 2021-03-04 RX ADMIN — BACITRACIN: 500 OINTMENT TOPICAL at 15:14

## 2021-03-04 RX ADMIN — TETANUS TOXOID, REDUCED DIPHTHERIA TOXOID AND ACELLULAR PERTUSSIS VACCINE, ADSORBED 0.5 ML: 5; 2.5; 8; 8; 2.5 SUSPENSION INTRAMUSCULAR at 15:06

## 2021-03-04 ASSESSMENT — PAIN DESCRIPTION - PAIN TYPE: TYPE: ACUTE PAIN

## 2021-03-04 ASSESSMENT — PAIN DESCRIPTION - DESCRIPTORS: DESCRIPTORS: BURNING

## 2021-03-06 NOTE — ED PROVIDER NOTES
[facility] ED  EMERGENCY DEPARTMENT ENCOUNTER      Pt Name: Maria Elena Keita  MRN: [id number]  [place] 1970  Date of evaluation: 3/4/2021  Provider: Gary Jimenes MD    CHIEF COMPLAINT       Chief Complaint   Patient presents with   [address]     was bit by a dog while on duty today - left knee         HISTORY OF PRESENT ILLNESS   (Location/Symptom, Timing/Onset, Context/Setting, Quality, Duration, Modifying Factors, Severity)  Note limiting factors. Maria Elena Keita is a 49 y.o. male who has a history of GERD, hyperlipidemia, kidney stones, presents to the emergency department for evaluation and management of dog bite to his left lower leg while on duty today as a . He said the dog was a small dog developed when he bit his leg. He does not know the immunization status of the dog. He has paperwork completed for the animal morning. There was minimal bleeding at the site which has since stopped. Tetanus vaccine status is not up-to-date. This patient is being evaluated during the COVID-19 pandemic. HPI    Nursing Notes were reviewed. REVIEW OF SYSTEMS    (2-9 systems for level 4, 10 or more for level 5)       REVIEW OF SYSTEMS    Constitutional: Negative for fever. Musculoskeletal: Negative for arthralgias, back pain and neck pain. Skin: Positive for left lower leg dog bite, negative for color change, pallor, rash  Neurological: Negative for dizziness, speech difficulty, weakness, distal tingling/numbness  Hematological: Negative for adenopathy. Does not bruise/bleed easily. Except as noted above the remainder of the review of systems was reviewed and negative. PASTMEDICAL HISTORY     Past Medical History:   Diagnosis Date    GERD (gastroesophageal reflux disease)     Hyperlipidemia     Kidney stones          SURGICAL HISTORY     History reviewed. No pertinent surgical history.       CURRENT MEDICATIONS       Discharge Medication List as of 3/4/2021 3:05 PM      CONTINUE these medications which have NOT CHANGED    Details   lansoprazole (PREVACID) 30 MG delayed release capsule Take 1 capsule by mouth daily, Disp-90 capsule, R-1Normal      fenofibrate (TRICOR) 145 MG tablet TAKE 1 TABLET DAILY, Disp-90 tablet, R-1Normal      naproxen sodium (ALEVE) 220 MG tablet Take 220 mg by mouth as needed for PainHistorical Med      tiZANidine (ZANAFLEX) 4 MG tablet Take 1 tablet by mouth nightly as needed (neck pain), Disp-30 tablet, R-0Normal      acetaminophen (TYLENOL) 500 MG tablet Take 1,000 mg by mouth every 6 hours as needed for PainHistorical Med      ibuprofen (ADVIL;MOTRIN) 200 MG tablet Take 800 mg by mouth every 6 hours as needed for PainHistorical Med             ALLERGIES     Patient has no known allergies.     FAMILY HISTORY       Family History   Problem Relation Age of Onset    Diabetes Mother     Cancer Mother         brreast    High Blood Pressure Mother     Cancer Father         colon    Heart Disease Father     High Cholesterol Brother           SOCIAL HISTORY       Social History     Socioeconomic History    Marital status:      Spouse name: None    Number of children: None    Years of education: None    Highest education level: None   Occupational History    None   Social Needs    Financial resource strain: Not hard at all   [employer] insecurity     Worry: Never true     Inability: Never true   [employer] needs     Medical: None     Non-medical: None   Tobacco Use    Smoking status: Former Smoker     Quit date: 2007     Years since quittin.0    Smokeless tobacco: Never Used   Substance and Sexual Activity    Alcohol use: Not Currently     Frequency: Never    Drug use: Never    Sexual activity: None   Lifestyle    Physical activity     Days per week: None     Minutes per session: None    Stress: None   Relationships    Social connections     Talks on phone: None     Gets together: None     Attends Church service: None     Active member of club or organization: None     Attends meetings of clubs or organizations: None     Relationship status: None    Intimate partner violence     Fear of current or ex partner: None     Emotionally abused: None     Physically abused: None     Forced sexual activity: None   Other Topics Concern    None   Social History Narrative    None       SCREENINGS    Mabel Coma Scale  Eye Opening: Spontaneous  Best Verbal Response: Oriented  Best Motor Response: Obeys commands  Mabel Coma Scale Score: 15        PHYSICAL EXAM    (up to 7 for level 4, 8 or more for level 5)     ED Triage Vitals [03/04/21 1442]   BP Temp Temp Source Pulse Resp SpO2 Height Weight   (!) 157/108 98 °F (36.7 °C) Oral 85 16 97 % -- 170 lb (77.1 kg)       Physical Exam  Physical Exam   Constitutional:  Appears well, well-developed and well-nourished. No distress noted. Non toxic in appearance. HENT:     Head: Normocephalic and atraumatic. Mouth/Throat: Oropharynx is clear and mucosa moist.   Eyes: Conjunctivae and EOM are normal. Pupils are equal, round, and reactive to light. No scleral icterus. There is no tearing or drainage. ]  Cardiovascular: Normal rate, regular rhythm, normal heartsounds and intact distal pulses. Exam reveals no gallop or friction rub. No murmur heard. Pulmonary/Chest: Effort normal and breath sounds are symmetric and normal. No respiratory distress. There are no wheezes, rales or rhonchi. Abdominal: Soft. Bowel sounds are normal. No distension or no mass exhibitted. There is no tenderness, rebound, rigidity or guarding. Genitourinary:   No CVA tenderness noted on examination. Musculoskeletal: Normal range of motion. No edema, tenderness or deformity. Lymphadenopathy:  No cervical adenopathy. Neurological:   alert and oriented to person, place, and time. Normal speech, normal comprehension, normal cognition,  Reflexes are normal.  There are no cranial nerve deficits.  Normal muscle tone, motor and sensory function including SILT exhibited. Strength 5 out of 5 in all extremities and torso. Coordination normal and gait normal.    Skin: Examination of the left leg shows that there are 2 tiny puncture marks below the region of the knee. Tiny amount of dried blood is noted at the site. Skin is warm and dry. No rash noted. No diaphoresis. No erythema. No pallor. Psychiatric: Pleasant and cooperative. Normal mood and affect. Behavior is  normal. Judgment and thought content normal.     DIAGNOSTIC RESULTS     EKG: All EKG's are interpreted by the Emergency Department Physician who either signs or Co-signs this chart in the absence of a cardiologist.    Not indicated. RADIOLOGY:   Non-plain film images such as CT, Ultrasoundand MRI are read by the radiologist. Plain radiographic images are visualized and preliminarily interpreted by the emergency physician with the below findings:    Not indicated. Interpretation per the Radiologist below, if available at the time of this note:    No orders to display         ED BEDSIDE ULTRASOUND:   Performed by ED Physician - none    LABS:  Labs Reviewed - No data to display    All other labs were within normal range or not returned as of this dictation. EMERGENCY DEPARTMENT COURSE and DIFFERENTIAL DIAGNOSIS/MDM:   Vitals:    Vitals:    03/04/21 1442   BP: (!) 157/108   Pulse: 85   Resp: 16   Temp: 98 °F (36.7 °C)   TempSrc: Oral   SpO2: 97%   Weight: 170 lb (77.1 kg)       Noted    MDM    CRITICAL CARE TIME   Total Critical Care time was 0 minutes      EDCOURSE       CONSULTS:  None    PROCEDURES:  Unless otherwise noted below, none     Procedures      Summation    Alex Wheeler is a 49 y.o. male who has a history of GERD, hyperlipidemia, kidney stones, presented for a minor dog bite. There is no disruption of the soft tissue except for tiny puncture marks. Tdap was provided. Augmentin started.   He is well, well hydrated, nontoxic, hemodynamically stable and satisfactory for discharge for outpatient management. Findings discussed at length with patient. I stressed to him to keep the area clean and dry and watch for any infection. I advised the patient that imaging is not indicated at this time. I instructed the patient to follow[facility] for [facility] of response to management of acute injury to determine if any further vaccinations needed. I instructed the patient to return to the ER if his condition worsens, if there is any concern for altered mental status, difficulty breathing, dehydration or loss of function. Patient Course:        ED Medicationsadministered this visit:    Medications   Tetanus-Diphth-Acell Pertussis (BOOSTRIX) injection 0.5 mL (0.5 mLs Intramuscular Given 3/4/21 4555)   bacitracin zinc ointment ( Topical Given 3/4/21 5688)       New Prescriptions from this visit:    Discharge Medication List as of 3/4/2021  3:05 PM      START taking these medications    Details   amoxicillin-clavulanate (AUGMENTIN) 875-125 MG per tablet Take 1 tablet by mouth 2 times daily for 10 days, Disp-20 tablet, R-0Normal             Follow-up:  [facility]  [address] [facility] [address]  [phone]  Go to   As needed, If symptoms worsen    Ronel Briones MD  [address] [address]  [phone]    In 1 week  As needed, If symptoms worsen      Follow-up with [facility] for further directions for rabies immunization. Call in 1 day          Final Impression:   1. Dog bite, initial encounter    2. Essential hypertension    3. Need for Tdap vaccination               (Please note that portions of this note werecompleted with a voice recognition program.  Efforts were made to edit the dictations but occasionally words are mis-transcribed.)    FINAL IMPRESSION      1. Dog bite, initial encounter    2. Essential hypertension    3.  Need for Tdap vaccination          DISPOSITION/PLAN   DISPOSITION Decision To Discharge 03/04/2021 02:59:59 PM      PATIENT REFERRED TO:  [facility]  [address] [address]  [phone]  Go to   As needed, If symptoms worsen    Fco Henderson MD  [address] [facility] [address]  [phone]    In 1 week  As needed, If symptoms worsen      Follow-up with [facility] for further directions for rabies immunization.   Call in 1 day        DISCHARGE MEDICATIONS:  Discharge Medication List as of 3/4/2021  3:05 PM      START taking these medications    Details   amoxicillin-clavulanate (AUGMENTIN) 875-125 MG per tablet Take 1 tablet by mouth 2 times daily for 10 days, Disp-20 tablet, R-0Normal                (Please note that portions of this note were completed with a voice recognition program.  Efforts were made to edit the dictations but occasionally words are mis-transcribed.)    Lucas Harris MD (electronically signed)  Attending Emergency Physician            Lucas Harris MD  03/06/21 0203

## 2021-03-10 ENCOUNTER — HOSPITAL ENCOUNTER (OUTPATIENT)
Dept: GENERAL RADIOLOGY | Age: 51
Discharge: HOME | End: 2021-03-12
Payer: COMMERCIAL

## 2021-03-10 ENCOUNTER — HOSPITAL ENCOUNTER (OUTPATIENT)
Age: 51
Discharge: HOME | End: 2021-03-12
Payer: COMMERCIAL

## 2021-03-10 DIAGNOSIS — M17.11 OSTEOARTHRITIS OF RIGHT KNEE, UNSPECIFIED OSTEOARTHRITIS TYPE: ICD-10-CM

## 2021-03-10 PROCEDURE — 73564 X-RAY EXAM KNEE 4 OR MORE: CPT

## 2021-03-22 LAB
CHOLESTEROL, TOTAL: 195 MG/DL
CHOLESTEROL/HDL RATIO: 2.9
GLUCOSE BLD-MCNC: 91 MG/DL
HDLC SERPL-MCNC: 67 MG/DL (ref 35–70)
LDL CHOLESTEROL CALCULATED: 107 MG/DL (ref 0–160)
NONHDLC SERPL-MCNC: NORMAL MG/DL
TRIGL SERPL-MCNC: 106 MG/DL
VLDLC SERPL CALC-MCNC: NORMAL MG/DL

## 2021-06-07 ENCOUNTER — HOSPITAL ENCOUNTER (EMERGENCY)
Age: 51
Discharge: HOME | End: 2021-06-07
Attending: FAMILY MEDICINE
Payer: COMMERCIAL

## 2021-06-07 ENCOUNTER — APPOINTMENT (OUTPATIENT)
Dept: GENERAL RADIOLOGY | Age: 51
End: 2021-06-07
Payer: COMMERCIAL

## 2021-06-07 VITALS
BODY MASS INDEX: 26.29 KG/M2 | RESPIRATION RATE: 16 BRPM | HEART RATE: 97 BPM | DIASTOLIC BLOOD PRESSURE: 85 MMHG | WEIGHT: 178 LBS | SYSTOLIC BLOOD PRESSURE: 159 MMHG | OXYGEN SATURATION: 96 % | TEMPERATURE: 98.6 F

## 2021-06-07 DIAGNOSIS — S62.525A NONDISPLACED FRACTURE OF DISTAL PHALANX OF LEFT THUMB, INITIAL ENCOUNTER FOR CLOSED FRACTURE: ICD-10-CM

## 2021-06-07 DIAGNOSIS — S63.125A DISLOCATION OF INTERPHALANGEAL JOINT OF LEFT THUMB, INITIAL ENCOUNTER: Primary | ICD-10-CM

## 2021-06-07 PROCEDURE — 26641 TREAT THUMB DISLOCATION: CPT

## 2021-06-07 PROCEDURE — 73140 X-RAY EXAM OF FINGER(S): CPT

## 2021-06-07 PROCEDURE — 2500000003 HC RX 250 WO HCPCS: Performed by: FAMILY MEDICINE

## 2021-06-07 PROCEDURE — 99283 EMERGENCY DEPT VISIT LOW MDM: CPT

## 2021-06-07 RX ORDER — LIDOCAINE HYDROCHLORIDE 10 MG/ML
5 INJECTION, SOLUTION INFILTRATION; PERINEURAL ONCE
Status: COMPLETED | OUTPATIENT
Start: 2021-06-07 | End: 2021-06-07

## 2021-06-07 RX ADMIN — LIDOCAINE HYDROCHLORIDE 5 ML: 10 INJECTION, SOLUTION INFILTRATION; PERINEURAL at 20:10

## 2021-06-07 ASSESSMENT — PAIN DESCRIPTION - PAIN TYPE: TYPE: ACUTE PAIN

## 2021-06-07 ASSESSMENT — PAIN SCALES - GENERAL
PAINLEVEL_OUTOF10: 6
PAINLEVEL_OUTOF10: 7

## 2021-06-07 ASSESSMENT — PAIN DESCRIPTION - LOCATION: LOCATION: HAND

## 2021-06-07 ASSESSMENT — PAIN DESCRIPTION - ONSET: ONSET: ON-GOING

## 2021-06-07 ASSESSMENT — PAIN DESCRIPTION - FREQUENCY: FREQUENCY: CONTINUOUS

## 2021-06-07 ASSESSMENT — PAIN DESCRIPTION - DESCRIPTORS: DESCRIPTORS: CONSTANT

## 2021-06-07 ASSESSMENT — PAIN DESCRIPTION - ORIENTATION: ORIENTATION: LEFT

## 2021-06-09 NOTE — ED PROVIDER NOTES
eMERGENCY dEPARTMENT eNCOUnter        [address] [facility]    Chief Complaint   Patient presents with    Hand Injury     Pt was playing softball, injuring 1st digit left hand with deformity noted. HPI    Patricia Figueroa is a 49 y.o. male who presents with thumb pain after softball injury today. The thumb appeared to be \"out of joint\". Unable to flex and extend the thumb. He is right-handed. Because of the pain and noted deformity he comes to the emergency room. The pain is moderate and worse if he tries to move it. No medication used. He works in law enforcement. REVIEW OF SYSTEMS    All body systems reviewed. Pertinent positive findings mentioned in the HPI. No other injury sustained. PAST MEDICAL HISTORY    Past Medical History:   Diagnosis Date    GERD (gastroesophageal reflux disease)     Hyperlipidemia     Kidney stones        SURGICAL HISTORY    History reviewed. No pertinent surgical history.     CURRENT MEDICATIONS    Current Outpatient Rx   Medication Sig Dispense Refill    lansoprazole (PREVACID) 30 MG delayed release capsule Take 1 capsule by mouth daily 90 capsule 1    fenofibrate (TRICOR) 145 MG tablet TAKE 1 TABLET DAILY 90 tablet 1    naproxen sodium (ALEVE) 220 MG tablet Take 220 mg by mouth as needed for Pain      acetaminophen (TYLENOL) 500 MG tablet Take 1,000 mg by mouth every 6 hours as needed for Pain      ibuprofen (ADVIL;MOTRIN) 200 MG tablet Take 800 mg by mouth every 6 hours as needed for Pain         ALLERGIES    No Known Allergies    FAMILY HISTORY    Family History   Problem Relation Age of Onset    Diabetes Mother     Cancer Mother         brreast    High Blood Pressure Mother     Cancer Father         colon    Heart Disease Father     High Cholesterol Brother        SOCIAL HISTORY    Social History     Socioeconomic History    Marital status:      Spouse name: None    Number of children: None    Years of education: None    Highest education level: None   Occupational History    None   Tobacco Use    Smoking status: Former Smoker     Quit date: 2007     Years since quittin.3    Smokeless tobacco: Never Used   Substance and Sexual Activity    Alcohol use: Not Currently    Drug use: Never    Sexual activity: None   Other Topics Concern    None   Social History Narrative    None     Social Determinants of Health     Financial Resource Strain:     Difficulty of Paying Living Expenses:    Food Insecurity:     Worried About Running Out of Food in the Last Year:     [address] in the Last Year:    Transportation Needs:     Lack of Transportation (Medical):  Lack of Transportation (Non-Medical):    Physical Activity:     Days of Exercise per Week:     Minutes of Exercise per Session:    Stress:     Feeling of Stress :    Social Connections:     Frequency of Communication with Friends and Family:     Frequency of Social Gatherings with Friends and Family:     Attends [employer] Services:     Active Member of Clubs or Organizations:     Attends Club or Organization Meetings:     Marital Status:    Intimate Partner Violence:     Fear of Current or Ex-Partner:     Emotionally Abused:     Physically Abused:     Sexually Abused:        PHYSICAL EXAM    VITAL SIGNS: BP (!) 159/85   Pulse 97   Temp 98.6 °F (37 °C) (Oral)   Resp 16   Wt 178 lb (80.7 kg)   SpO2 96%   BMI 26.29 kg/m²   Constitutional:  Well developed, well nourished, 52-year-old male with left thumb pain and deformity, no acute distress, non-toxic appearance   HENT:  Atraumatic, external ears normal, nose normal, oropharynx moist.  Neck- normal range of motion, no tenderness, supple. Trachea is midline. There is no stridor. Respiratory:  No respiratory distress, normal breath sounds.    Cardiovascular:  Normal rate, normal rhythm, no murmur noted  GI:  Soft, nondistended, nontender   Musculoskeletal: Left thumb tenderness and deformity at the PIP with distal portion of the thumb displaced radially. No flexion at the thumb. It is in extension. No nail involvement. The thumb is painful. Otherwise there is no injury at the hand or wrist.  Integument:  Well hydrated, no upper extremity bruising or rash   Neurologic: Alert and oriented male. Fluent speech and appropriate mentation. No gross motor deficit. He does have left thumb injury limiting range of motion. General sensation intact over general body dermatomes tested. RADIOLOGY/PROCEDURES    X-ray of left hand shows subtle linear lucency of the distal phalanx of the thumb suggesting nondisplaced fracture. Otherwise thumb is intact with no dislocation. X-ray is after close reduction of dislocation at the PIP. Procedure of reduction of dislocated thumb. With informed verbal consent after risks and benefits discussed with patient and wife present. Neurosensory intact allowing for the pain and limited motion. Hibiclens prep. The left thumb has digital block with 1% lidocaine. The left thumb is displaced laterally at the PIP. Appears to be at 20-30 degree angle. The base of the thumb is stable and grasped with left hand. I hold the left thumb distally with my right hand and apply traction and a lateral movement of the thumb to an anatomic position with easy reduction of the joint. Improved alignment as noted by myself and the patient. Sensation is intact and good circulation. Splint is placed. X-ray finger. ED COURSE & MEDICAL DECISION MAKING    Pertinent Labs & Imaging studies reviewed. (See chart for details)    Summation      Patient Course: 41-year-old male with right thumb pain after dislocation from a softball injury. Digital block with reduction accomplished. Neurovascular intact both before and after procedure. X-ray shows good alignment. Possible distal phalanx fracture. Splint is placed. Activity did discuss and off work.   Already has upcoming orthopedic

## 2021-07-14 ENCOUNTER — OFFICE VISIT (OUTPATIENT)
Dept: FAMILY MEDICINE CLINIC | Age: 51
End: 2021-07-14
Payer: COMMERCIAL

## 2021-07-14 ENCOUNTER — HOSPITAL ENCOUNTER (OUTPATIENT)
Age: 51
Discharge: HOME | End: 2021-07-14
Payer: COMMERCIAL

## 2021-07-14 VITALS
DIASTOLIC BLOOD PRESSURE: 84 MMHG | WEIGHT: 177 LBS | HEIGHT: 69 IN | SYSTOLIC BLOOD PRESSURE: 136 MMHG | HEART RATE: 74 BPM | BODY MASS INDEX: 26.22 KG/M2 | OXYGEN SATURATION: 98 %

## 2021-07-14 DIAGNOSIS — K21.9 GASTROESOPHAGEAL REFLUX DISEASE WITHOUT ESOPHAGITIS: ICD-10-CM

## 2021-07-14 DIAGNOSIS — E78.00 PURE HYPERCHOLESTEROLEMIA: ICD-10-CM

## 2021-07-14 DIAGNOSIS — Z12.11 COLON CANCER SCREENING: Primary | ICD-10-CM

## 2021-07-14 PROCEDURE — 99213 OFFICE O/P EST LOW 20 MIN: CPT | Performed by: FAMILY MEDICINE

## 2021-07-14 PROCEDURE — 84153 ASSAY OF PSA TOTAL: CPT

## 2021-07-14 PROCEDURE — 36415 COLL VENOUS BLD VENIPUNCTURE: CPT

## 2021-07-14 RX ORDER — LANSOPRAZOLE 30 MG/1
30 CAPSULE, DELAYED RELEASE ORAL DAILY
Qty: 90 CAPSULE | Refills: 3 | Status: SHIPPED | OUTPATIENT
Start: 2021-07-14 | End: 2022-07-13 | Stop reason: SDUPTHER

## 2021-07-14 RX ORDER — FENOFIBRATE 145 MG/1
TABLET, COATED ORAL
Qty: 90 TABLET | Refills: 3 | Status: SHIPPED | OUTPATIENT
Start: 2021-07-14 | End: 2022-07-13 | Stop reason: SDUPTHER

## 2021-07-14 SDOH — ECONOMIC STABILITY: FOOD INSECURITY: WITHIN THE PAST 12 MONTHS, THE FOOD YOU BOUGHT JUST DIDN'T LAST AND YOU DIDN'T HAVE MONEY TO GET MORE.: NEVER TRUE

## 2021-07-14 SDOH — ECONOMIC STABILITY: FOOD INSECURITY: WITHIN THE PAST 12 MONTHS, YOU WORRIED THAT YOUR FOOD WOULD RUN OUT BEFORE YOU GOT MONEY TO BUY MORE.: NEVER TRUE

## 2021-07-14 ASSESSMENT — ENCOUNTER SYMPTOMS
COUGH: 0
HEARTBURN: 0
CHOKING: 0
SHORTNESS OF BREATH: 0
SORE THROAT: 0

## 2021-07-14 ASSESSMENT — PATIENT HEALTH QUESTIONNAIRE - PHQ9
1. LITTLE INTEREST OR PLEASURE IN DOING THINGS: 0
SUM OF ALL RESPONSES TO PHQ QUESTIONS 1-9: 0
SUM OF ALL RESPONSES TO PHQ9 QUESTIONS 1 & 2: 0
2. FEELING DOWN, DEPRESSED OR HOPELESS: 0

## 2021-07-14 ASSESSMENT — SOCIAL DETERMINANTS OF HEALTH (SDOH): HOW HARD IS IT FOR YOU TO PAY FOR THE VERY BASICS LIKE FOOD, HOUSING, MEDICAL CARE, AND HEATING?: NOT VERY HARD

## 2021-07-14 NOTE — PATIENT INSTRUCTIONS
Survey: You may be receiving a survey from [employer] regarding your visit today. You may get this in the mail, through your MyChart or in your email. Please complete the survey to enable us to provide the highest quality of care to you and your family. Please also, mention our names. If you cannot score us as very good (5 Stars) on any question, please feel free to call the office to discuss how we could have made your experience exceptional.      Thank You!         MD Diane bOando LPN

## 2021-07-14 NOTE — PROGRESS NOTES
HPI Notes    Name: Shefali Chavis  : 1970        Chief Complaint:     Chief Complaint   Patient presents with    Hyperlipidemia    Gastroesophageal Reflux    Health Maintenance     discuss need for colonoscopy, family h/o colon cancer       History of Present Illness:     Shefali Chavis is a 49 y.o.  male who presents with Hyperlipidemia, Gastroesophageal Reflux, and Health Maintenance (discuss need for colonoscopy, family h/o colon cancer)      Hyperlipidemia  This is a chronic problem. The current episode started more than 1 year ago. The problem is controlled. Recent lipid tests were reviewed and are normal. He has no history of hypothyroidism. There are no known factors aggravating his hyperlipidemia. Pertinent negatives include no chest pain, focal weakness or shortness of breath. Current antihyperlipidemic treatment includes fibric acid derivatives. The current treatment provides significant improvement of lipids. Risk factors for coronary artery disease include dyslipidemia. Gastroesophageal Reflux  He reports no chest pain, no choking, no coughing, no heartburn or no sore throat. This is a chronic problem. The current episode started more than 1 year ago. The problem has been unchanged. Pertinent negatives include no melena or weight loss. He has tried a PPI for the symptoms. The treatment provided significant relief. Past Medical History:     Past Medical History:   Diagnosis Date    GERD (gastroesophageal reflux disease)     Hyperlipidemia     Kidney stones       Reviewed all health maintenance requirements and ordered appropriate tests  Health Maintenance Due   Topic Date Due    Hepatitis C screen  Never done    HIV screen  Never done    Colon cancer screen colonoscopy  Never done    Shingles Vaccine (1 of 2) Never done       Past Surgical History:     History reviewed. No pertinent surgical history.      Medications:       Prior to Admission medications    Medication Sig Start Date End Date Taking? Authorizing Provider   lansoprazole (PREVACID) 30 MG delayed release capsule Take 1 capsule by mouth daily 7/14/21  Yes Jl Mathias MD   fenofibrate (TRICOR) 145 MG tablet TAKE 1 TABLET DAILY 7/14/21  Yes Jl Mathias MD   acetaminophen (TYLENOL) 500 MG tablet Take 1,000 mg by mouth every 6 hours as needed for Pain    Historical Provider, MD   ibuprofen (ADVIL;MOTRIN) 200 MG tablet Take 800 mg by mouth every 6 hours as needed for Pain    Historical Provider, MD        Allergies:       Patient has no known allergies. Social History:     Tobacco:    reports that he quit smoking about 14 years ago. He has a 10.00 pack-year smoking history. He has never used smokeless tobacco.  Alcohol:      reports previous alcohol use. Drug Use:  reports no history of drug use. Family History:     Family History   Problem Relation Age of Onset    Diabetes Mother     Cancer Mother         brreast    High Blood Pressure Mother     Heart Failure Father     Cancer Father         colon    Heart Disease Father     Prostate Cancer Father     High Cholesterol Brother        Review of Systems:       Review of Systems   Constitutional: Negative for weight loss. HENT: Negative for sore throat. Respiratory: Negative for cough, choking and shortness of breath. Cardiovascular: Negative for chest pain. Gastrointestinal: Negative for heartburn and melena. Neurological: Negative for focal weakness. Physical Exam:     Physical Exam  Vitals reviewed. Constitutional:       General: He is not in acute distress. Appearance: Normal appearance. He is well-developed. He is not ill-appearing. HENT:      Head: Normocephalic and atraumatic. Eyes:      General:         Right eye: No discharge. Left eye: No discharge. Conjunctiva/sclera: Conjunctivae normal.      Pupils: Pupils are equal, round, and reactive to light. Neck:      Thyroid: No thyromegaly. Cardiovascular:      Rate and Rhythm: Normal rate and regular rhythm. Heart sounds: Normal heart sounds. No murmur heard. Pulmonary:      Effort: Pulmonary effort is normal.      Breath sounds: Normal breath sounds. Abdominal:      General: Bowel sounds are normal. There is no distension. Palpations: Abdomen is soft. Tenderness: There is no abdominal tenderness. Musculoskeletal:      Cervical back: Neck supple. Skin:     Findings: No erythema or rash. Neurological:      Mental Status: He is alert and oriented to person, place, and time. Psychiatric:         Mood and Affect: Mood normal.         Vitals:  /84   Pulse 74   Ht 5' 9\" (1.753 m)   Wt 177 lb (80.3 kg)   SpO2 98%   BMI 26.14 kg/m²       Data:     Lab Results   Component Value Date     09/03/2018    K 3.9 09/03/2018     09/03/2018    CO2 24 09/03/2018    BUN 15 09/03/2018    CREATININE 0.84 09/03/2018    GLUCOSE 92 09/03/2018    PROT 7.6 08/24/2017    LABALBU 4.5 08/24/2017    LABALBU 4.7 02/14/2012    BILITOT 0.35 08/24/2017    ALKPHOS 52 08/24/2017    AST 23 08/24/2017    ALT 34 08/24/2017     Lab Results   Component Value Date    WBC 11.1 09/03/2018    RBC 4.81 09/03/2018    HGB 14.1 09/03/2018    HCT 42.2 09/03/2018    MCV 87.9 09/03/2018    MCH 29.2 09/03/2018    MCHC 33.3 09/03/2018    RDW 14.1 09/03/2018     09/03/2018    MPV NOT REPORTED 09/03/2018     Lab Results   Component Value Date    TSH 4.06 08/27/2015     Lab Results   Component Value Date    CHOL 228 01/21/2020    HDL 74 01/21/2020    PSA 0.2 05/10/2019          Assessment/Plan:        1. Gastroesophageal reflux disease without esophagitis  Stable on prevacid  - lansoprazole (PREVACID) 30 MG delayed release capsule; Take 1 capsule by mouth daily  Dispense: 90 capsule; Refill: 1    2. Pure hypercholesterolemia  Stable on tricor  - fenofibrate (TRICOR) 145 MG tablet; TAKE 1 TABLET DAILY  Dispense: 90 tablet; Refill: 1    3.  Colon cancer screening  Referral to Dr Donnie Gaines received counseling on the following healthy behaviors: nutrition and exercise  Reviewed prior labs and health maintenance  Continue current medications, diet and exercise. Discussed use, benefit, and side effects of prescribed medications. Barriers to medication compliance addressed. Patient given educational materials - see patient instructions  Was a self-tracking handout given in paper form or via [employer]? Yes    Requested Prescriptions     Signed Prescriptions Disp Refills    lansoprazole (PREVACID) 30 MG delayed release capsule 90 capsule 3     Sig: Take 1 capsule by mouth daily    fenofibrate (TRICOR) 145 MG tablet 90 tablet 3     Sig: TAKE 1 TABLET DAILY       All patient questions answered. Patient voiced understanding. Quality Measures    Body mass index is 26.14 kg/m². Normal. Weight control planned discussed Healthy diet and regular exercise. BP: 136/84 Blood pressure is normal. Treatment plan consists of No treatment change needed. Lab Results   Component Value Date    LDLCALC 117 01/21/2020    LDLCHOLESTEROL 107 08/24/2017    (goal LDL reduction with dx if diabetes is 50% LDL reduction)      PHQ Scores 7/14/2021 2/6/2020 2/14/2019 3/1/2018 8/29/2016   PHQ2 Score 0 0 0 0 0   PHQ9 Score 0 0 0 0 0     Interpretation of Total Score Depression Severity: 1-4 = Minimal depression, 5-9 = Mild depression, 10-14 = Moderate depression, 15-19 = Moderately severe depression, 20-27 = Severe depression      Return in about 1 year (around 7/14/2022).       Electronically signed by Svetlana Jackson MD on 7/14/2021 at 3:05 PM

## 2021-07-15 LAB — PROSTATE SPECIFIC ANTIGEN: 0.25 UG/L

## 2021-07-26 ENCOUNTER — OFFICE VISIT (OUTPATIENT)
Dept: SURGERY | Age: 51
End: 2021-07-26
Payer: COMMERCIAL

## 2021-07-26 VITALS
BODY MASS INDEX: 26.36 KG/M2 | DIASTOLIC BLOOD PRESSURE: 88 MMHG | WEIGHT: 178 LBS | RESPIRATION RATE: 18 BRPM | HEIGHT: 69 IN | HEART RATE: 94 BPM | TEMPERATURE: 98.9 F | SYSTOLIC BLOOD PRESSURE: 146 MMHG

## 2021-07-26 DIAGNOSIS — Z80.3 FAMILY HISTORY OF BREAST CANCER: ICD-10-CM

## 2021-07-26 DIAGNOSIS — Z80.0 FAMILY HISTORY OF COLON CANCER: ICD-10-CM

## 2021-07-26 DIAGNOSIS — K21.9 GASTROESOPHAGEAL REFLUX DISEASE, UNSPECIFIED WHETHER ESOPHAGITIS PRESENT: Primary | ICD-10-CM

## 2021-07-26 DIAGNOSIS — Z86.16 HISTORY OF 2019 NOVEL CORONAVIRUS DISEASE (COVID-19): ICD-10-CM

## 2021-07-26 DIAGNOSIS — Z01.818 PRE-OP TESTING: ICD-10-CM

## 2021-07-26 PROCEDURE — 99203 OFFICE O/P NEW LOW 30 MIN: CPT | Performed by: SURGERY

## 2021-07-26 ASSESSMENT — ENCOUNTER SYMPTOMS
BACK PAIN: 0
SHORTNESS OF BREATH: 0
BLOOD IN STOOL: 0
COUGH: 0
VOMITING: 0
NAUSEA: 0
CHOKING: 0
TROUBLE SWALLOWING: 0
SORE THROAT: 0
ABDOMINAL PAIN: 1

## 2021-07-26 NOTE — PATIENT INSTRUCTIONS
Patient Education        Upper GI Endoscopy: Before Your Procedure  What is an upper GI endoscopy? An upper gastrointestinal (or GI) endoscopy is a test that allows your doctor to look at the inside of your esophagus, stomach, and the first part of your small intestine, called the duodenum. The esophagus is the tube that carries food to your stomach. The doctor uses a thin, lighted tube that bends. It is called an endoscope, or scope. The doctor puts the tip of the scope in your mouth and gently moves it down your throat. The scope is a flexible video camera. The doctor looks at a monitor (like a TV set or a computer screen) as he or she moves the scope. A doctor may do this procedure to look for ulcers, tumors, infection, or bleeding. It also can be used to look for signs of acid backing up into your esophagus. This is called gastroesophageal reflux disease, or GERD. The doctor can use the scope to take a sample of tissue for study (a biopsy). The doctor also can use the scope to take out growths or stop bleeding. Follow-up care is a key part of your treatment and safety. Be sure to make and go to all appointments, and call your doctor if you are having problems. It's also a good idea to know your test results and keep a list of the medicines you take. How do you prepare for the procedure? Procedures can be stressful. This information will help you understand what you can expect. And it will help you safely prepare for your procedure. Preparing for the procedure    · Do not eat or drink anything for 6 to 8 hours before the test. An empty stomach helps your doctor see your stomach clearly during the test. It also reduces your chances of vomiting. If you vomit, there is a small risk that the vomit could enter your lungs.  (This is called aspiration.) If the test is done in an emergency, a tube may be inserted through your nose or mouth to empty your stomach.     · Do not take sucralfate (Carafate) or antacids on the day of the test. These medicines can make it hard for your doctor to see your upper GI tract.     · If your doctor tells you to, stop taking iron supplements 7 to 14 days before the test.     · Be sure you have someone to take you home. Anesthesia and pain medicine will make it unsafe for you to drive or get home on your own.     · Understand exactly what procedure is planned, along with the risks, benefits, and other options. · Tell your doctor ALL the medicines, vitamins, supplements, and herbal remedies you take. Some may increase the risk of problems during your procedure. Your doctor will tell you if you should stop taking any of them before the procedure and how soon to do it.     · If you take aspirin or some other blood thinner, ask your doctor if you should stop taking it before your procedure. Make sure that you understand exactly what your doctor wants you to do. These medicines increase the risk of bleeding.     · Make sure your doctor and the hospital have a copy of your advance directive. If you don't have one, you may want to prepare one. It lets others know your health care wishes. It's a good thing to have before any type of surgery or procedure. What happens on the day of the procedure? · Follow the instructions exactly about when to stop eating and drinking. If you don't, your procedure may be canceled. If your doctor told you to take your medicines on the day of the procedure, take them with only a sip of water.     · Take a bath or shower before you come in for your procedure. Do not apply lotions, perfumes, deodorants, or nail polish.     · Take off all jewelry and piercings. And take out contact lenses, if you wear them. At the hospital or surgery center   · Bring a picture ID.     · The test may take 15 to 30 minutes.     · The doctor may spray medicine on the back of your throat to numb it.  You also will get medicine to prevent pain and to relax you.     · You will lie on your left side. The doctor will put the scope in your mouth and toward the back of your throat. The doctor will tell you when to swallow. This helps the scope move down your throat. You will be able to breathe normally. The doctor will move the scope down your esophagus into your stomach. The doctor also may look at the duodenum.     · If your doctor wants to take a sample of tissue for a biopsy, he or she may use small surgical tools, which are put into the scope, to cut off some tissue. You will not feel a biopsy, if one is taken. The doctor also can use the tools to stop bleeding or to do other treatments, if needed.     · You will stay at the hospital or surgery center for 1 to 2 hours until the medicine you were given wears off. What happens after an upper GI endoscopy? · After the test, you may belch and feel bloated for a while.     · You may have a tickling, dry throat or mouth. You may feel a bit hoarse, and you may have a mild sore throat. These symptoms may last several days. Throat lozenges and warm saltwater gargles can help relieve the throat symptoms.     · Don't drive or operate machinery for 12 hours after the test.     · Your doctor will tell you when you can go back to your usual diet and activities.     · Don't drink alcohol for 12 to 24 hours after the test.   When should you call your doctor? · You have questions or concerns.     · You don't understand how to prepare for your procedure.     · You become ill before the procedure (such as fever, flu, or a cold).     · You need to reschedule or have changed your mind about having the procedure. Where can you learn more? Go to https://[employer]". org and sign in to your [employer] account. Enter P790 in the [employer] box to learn more about \"Upper GI Endoscopy: Before Your Procedure. \"     If you do not have an account, please click on the \"Sign Up Now\" link.   Current as of: February 10, 2021               Content Version: 12.9  © 5988-3338 [employer]. Care instructions adapted under license by [facility] ([facility]). If you have questions about a medical condition or this instruction, always ask your healthcare professional. [address] any warranty or liability for your use of this information. Patient Education        Learning About Colonoscopy  What is a colonoscopy? A colonoscopy is a test (also called a procedure) that lets a doctor look inside your large intestine. The doctor uses a thin, lighted tube called a colonoscope. The doctor uses it to look for small growths called polyps, colon or rectal cancer (colorectal cancer), or other problems like bleeding. During the procedure, the doctor can take samples of tissue. The samples can then be checked for cancer or other conditions. The doctor can also take out polyps. How is a colonoscopy done? This procedure is done in a doctor's office or a clinic or hospital. You will get medicine to help you relax and not feel pain. Some people find that they don't remember having the test because of the medicine. The doctor gently moves the colonoscope, or scope, through the colon. The scope is also a small video camera. It lets the doctor see the colon and take pictures. How do you prepare for the procedure? You need to clean out your colon before the procedure so the doctor can see your colon. This depends on which \"colon prep\" your doctor recommends. To clean out your colon, you'll do a \"colon prep\" before the test. This means you stop eating solid foods and drink only clear liquids. You can have water, tea, coffee, clear juices, clear broths, flavored ice pops, and gelatin (such as Jell-O). Do not drink anything red or purple. The day or night before the procedure, you drink a large amount of a special liquid. This causes loose, frequent stools. You will go to the bathroom a lot.  Your doctor may have you drink part of the liquid disclaims any warranty or liability for your use of this information.

## 2021-07-26 NOTE — LETTER
[address]  [address] [facility] [address]  Phone: [phone]  Fax: [phone]    Carlota Washburn MD    July 26, 2021     [facility] [id number]    Patient: Shefali Chavis   MR Number: [id number]   YOB: 1970   Date of Visit: 7/26/2021       Dear Kiki Carrillo: Thank you for referring Earnestine Ludwig to me for evaluation/treatment. Below are the relevant portions of my assessment and plan of care. ASSESSMENT     Diagnosis Orders   1. Gastroesophageal reflux disease, unspecified whether esophagitis present     2. BMI 26.0-26.9,adult     3. History of 2019 novel coronavirus disease (COVID-19)     4. Family history of colon cancer     5. Family history of breast cancer     6. Pre-op testing  EKG 12 Lead        PLAN    Discussed at length with Mr. Delano Cronin his long history of epigastric pain with reflux symptoms, typically well managed with Prevacid. We will proceed with diagnostic EGD and screening colonoscopy. Risks, benefits, alternatives thoroughly reviewed and accepted by Mr. Mcmahan, including GI bleeding, perforation, missed lesions, COVID-19 exposure/infection, etc.     If you have questions, please do not hesitate to call me. I look forward to following Tennie Aschoff along with you.     Sincerely,    MD Carlota Naylor MD no chest pain and no edema.

## 2021-07-26 NOTE — PROGRESS NOTES
eMlvin Forrester MD  General Surgery, Endoscopy  Chief Medical Officer    [address] [facility]  [address] [address]  Dept: [phone]  Fax: [phone]    CHIEF COMPLAINT  Chief Complaint   Patient presents with    New Patient    Colonoscopy     Patient referred by Dr Antonia Almeida for colonoscopy consult. Patient denies any symptoms. Family history of colon cancer, father. History of COVID-19 12/2020, has completed vaccination. HPI    Mr Rashida Willams is a pleasant 23-year-old white male kindly referred to me by Kp Patel for evaluation of a long history of reflux symptoms and consideration of screening colonoscopy. He has had epigastric pain and heartburn for years. Typically well managed with Prevacid. Normal appetite. Daily bowel movements, formed and brown, without blood. No recent weight changes, 178 pounds, BMI 26. History of COVID-19 December 2020. He has completed his COVID-19 vaccinations. No cough, fever nor other respiratory symptoms. Father treated for colon cancer. Mother treated for breast cancer. Patient quit tobacco 2007. Review of Systems   Constitutional: Negative for activity change, appetite change, chills, fever and unexpected weight change. HENT: Negative for nosebleeds, sneezing, sore throat and trouble swallowing. Eyes: Negative for visual disturbance. Respiratory: Negative for cough, choking and shortness of breath. Cardiovascular: Negative for chest pain, palpitations and leg swelling. Gastrointestinal: Positive for abdominal pain (epigastric with GERD). Negative for blood in stool, nausea and vomiting. Genitourinary: Negative for dysuria, flank pain and hematuria. Musculoskeletal: Positive for arthralgias. Negative for back pain, gait problem and myalgias. Allergic/Immunologic: Negative for immunocompromised state. Neurological: Negative for dizziness, seizures, syncope, weakness and headaches.    Hematological: Does not bruise/bleed easily. Psychiatric/Behavioral: Negative for confusion and sleep disturbance. Past Medical History:   Diagnosis Date    GERD (gastroesophageal reflux disease)     Hyperlipidemia     Kidney stones        History reviewed. No pertinent surgical history. Family History   Problem Relation Age of Onset    Diabetes Mother     Cancer Mother         breast    High Blood Pressure Mother     Heart Failure Father     Cancer Father         colon    Heart Disease Father     Prostate Cancer Father     High Cholesterol Brother        Allergies:  See list    Current Outpatient Medications   Medication Sig Dispense Refill    lansoprazole (PREVACID) 30 MG delayed release capsule Take 1 capsule by mouth daily 90 capsule 3    fenofibrate (TRICOR) 145 MG tablet TAKE 1 TABLET DAILY 90 tablet 3    acetaminophen (TYLENOL) 500 MG tablet Take 1,000 mg by mouth every 6 hours as needed for Pain      ibuprofen (ADVIL;MOTRIN) 200 MG tablet Take 800 mg by mouth every 6 hours as needed for Pain       No current facility-administered medications for this visit.        Social History     Socioeconomic History    Marital status:      Spouse name: None    Number of children: None    Years of education: None    Highest education level: None   Occupational History    None   Tobacco Use    Smoking status: Former Smoker     Packs/day: 1.00     Years: 10.00     Pack years: 10.00     Quit date: 2007     Years since quittin.4    Smokeless tobacco: Never Used   Substance and Sexual Activity    Alcohol use: Not Currently    Drug use: Never    Sexual activity: None   Other Topics Concern    None   Social History Narrative    None     Social Determinants of Health     Financial Resource Strain: Low Risk     Difficulty of Paying Living Expenses: Not very hard   Food Insecurity: No Food Insecurity    Worried About Running Out of Food in the Last Year: Never true    Neil of Food in the Last Year: Never true   Transportation Needs:     Lack of Transportation (Medical):  Lack of Transportation (Non-Medical):    Physical Activity:     Days of Exercise per Week:     Minutes of Exercise per Session:    Stress:     Feeling of Stress :    Social Connections:     Frequency of Communication with Friends and Family:     Frequency of Social Gatherings with Friends and Family:     Attends Yarsani Services:     Active Member of Clubs or Organizations:     Attends Club or Organization Meetings:     Marital Status:    Intimate Partner Violence:     Fear of Current or Ex-Partner:     Emotionally Abused:     Physically Abused:     Sexually Abused:        BP (!) 146/88 (Site: Left Upper Arm, Position: Sitting, Cuff Size: Medium Adult)   Pulse 94   Temp 98.9 °F (37.2 °C) (Infrared)   Resp 18   Ht 5' 9\" (1.753 m)   Wt 178 lb (80.7 kg)   BMI 26.29 kg/m²      Physical Exam  Vitals and nursing note reviewed. Constitutional:       Appearance: He is well-developed. HENT:      Head: Normocephalic and atraumatic. Eyes:      General: No scleral icterus. Conjunctiva/sclera: Conjunctivae normal.      Pupils: Pupils are equal, round, and reactive to light. Neck:      Vascular: No JVD. Trachea: No tracheal deviation. Cardiovascular:      Rate and Rhythm: Normal rate and regular rhythm. Pulmonary:      Effort: Pulmonary effort is normal. No respiratory distress. Chest:      Chest wall: No tenderness. Abdominal:      General: There is no distension. Palpations: Abdomen is soft. There is no mass. Tenderness: There is no abdominal tenderness. There is no guarding or rebound. Musculoskeletal:         General: Normal range of motion. Cervical back: Normal range of motion and neck supple. Lymphadenopathy:      Cervical: No cervical adenopathy. Skin:     General: Skin is warm and dry. Findings: No erythema or rash.    Neurological:      Mental Status: He is alert and oriented to person, place, and time. Cranial Nerves: No cranial nerve deficit. Psychiatric:         Behavior: Behavior normal.         Thought Content: Thought content normal.         Judgment: Judgment normal.         ASSESSMENT     Diagnosis Orders   1. Gastroesophageal reflux disease, unspecified whether esophagitis present     2. BMI 26.0-26.9,adult     3. History of 2019 novel coronavirus disease (COVID-19)     4. Family history of colon cancer     5. Family history of breast cancer     6. Pre-op testing  EKG 12 Lead        PLAN    Discussed at length with  Sánchez Ríos his long history of epigastric pain with reflux symptoms, typically well managed with Prevacid. We will proceed with diagnostic EGD and screening colonoscopy. Risks, benefits, alternatives thoroughly reviewed and accepted by Mr. Mcmahan, including GI bleeding, perforation, missed lesions, COVID-19 exposure/infection, etc.     Juvenal Sanders MD

## 2021-08-03 ENCOUNTER — HOSPITAL ENCOUNTER (OUTPATIENT)
Age: 51
Discharge: HOME | End: 2021-08-03
Payer: COMMERCIAL

## 2021-08-03 DIAGNOSIS — Z01.818 PRE-OP TESTING: ICD-10-CM

## 2021-08-03 PROCEDURE — 93005 ELECTROCARDIOGRAM TRACING: CPT

## 2021-08-03 NOTE — PROGRESS NOTES
[facility]   [facility]    Name: Bere Moreno  : 1970  Patient Phone: [phone] (home)     Procedure: Colonoscopy, EGD  Date of Procedure: 21  Surgeon: Freda Spence MD    Ht:  5' 9\" (175.3 cm)  Wt: 175 lb (79.4 kg)  Wt method: Allergies: No Known Allergies        Latex Allergy Screening Tool  Have you ever had a reaction to or been told by a physician that you have an allergy to latex or natural rubber?: No    There were no vitals filed for this visit. No LMP for male patient. Do you take blood thinners? [] Yes    [x] No         Instructed to stop blood thinners prior to procedure? [] Yes    [] No      [x] N/A   Do you have sleep apnea? [] Yes    [x] No     Do you have acid reflux ? [x] Yes    [] No     Do you have  hiatal hernia? [] Yes    [x] No    Do you ever experience motion sickness? [] Yes    [x] No     Have you had a respiratory infection or sore throat in last 4 weeks before surgery? [] Yes    [x] No     Do you have poorly controlled asthma or COPD? Difficulty with intubation in past? [] Yes    [x] No      [] Yes    [x] No       Do you have a history of angina in the last month or symptomatic arrhythmia? [] Yes    [x] No     Do you have significant central nervous system disease? [] Yes    [x] No     Have you had an EKG, labs, or chest xray in last 12 months? If yes provide copies to anesthesia   [x] Yes    [] No       [] Lab    [x] EKG    [] CXR     Have you had a stress test?     [] Yes    [x] No    When/where:    Was it normal?    [] Yes    [] No     Do you or your family have a history of Malignant Hyperthermia? [] Yes    [x] No           Do you smoke? [] Yes    [x] No      Please refrain from smoking on the day of surgery.       Patient instructed on: [x] NPO Status   [x] Meds to Take  [x] Ride Home  [x]No Jewelry/Contact Lenses/Nail [place]  [x] Prep/Lax/Clear Liquids    [] Chlorhexidene     DOS Patient Needs [] HCG   [] Blood Sugar  [] PT/INR    [] T&S       COVID Vaccinated? [x] Yes    [] No                     Patient instructed on the pre-operative, intra-operative, and post-operative process? Yes  Medication instructions reviewed with patient?   Yes

## 2021-08-04 LAB
EKG ATRIAL RATE: 72 BPM
EKG P AXIS: 32 DEGREES
EKG P-R INTERVAL: 168 MS
EKG Q-T INTERVAL: 388 MS
EKG QRS DURATION: 110 MS
EKG QTC CALCULATION (BAZETT): 424 MS
EKG R AXIS: 47 DEGREES
EKG T AXIS: 34 DEGREES
EKG VENTRICULAR RATE: 72 BPM

## 2021-08-04 PROCEDURE — 93010 ELECTROCARDIOGRAM REPORT: CPT | Performed by: INTERNAL MEDICINE

## 2021-08-09 ENCOUNTER — ANESTHESIA (OUTPATIENT)
Dept: ENDOSCOPY | Age: 51
End: 2021-08-09
Payer: COMMERCIAL

## 2021-08-09 ENCOUNTER — HOSPITAL ENCOUNTER (OUTPATIENT)
Age: 51
Setting detail: OUTPATIENT SURGERY
Discharge: HOME | End: 2021-08-09
Attending: SURGERY | Admitting: SURGERY
Payer: COMMERCIAL

## 2021-08-09 ENCOUNTER — ANESTHESIA EVENT (OUTPATIENT)
Dept: ENDOSCOPY | Age: 51
End: 2021-08-09
Payer: COMMERCIAL

## 2021-08-09 VITALS
RESPIRATION RATE: 14 BRPM | DIASTOLIC BLOOD PRESSURE: 104 MMHG | OXYGEN SATURATION: 97 % | SYSTOLIC BLOOD PRESSURE: 153 MMHG | TEMPERATURE: 98.6 F

## 2021-08-09 VITALS
WEIGHT: 175 LBS | OXYGEN SATURATION: 96 % | HEART RATE: 75 BPM | DIASTOLIC BLOOD PRESSURE: 87 MMHG | TEMPERATURE: 98 F | BODY MASS INDEX: 25.92 KG/M2 | HEIGHT: 69 IN | RESPIRATION RATE: 18 BRPM | SYSTOLIC BLOOD PRESSURE: 137 MMHG

## 2021-08-09 PROCEDURE — 2500000003 HC RX 250 WO HCPCS: Performed by: NURSE ANESTHETIST, CERTIFIED REGISTERED

## 2021-08-09 PROCEDURE — 3609012400 HC EGD TRANSORAL BIOPSY SINGLE/MULTIPLE: Performed by: SURGERY

## 2021-08-09 PROCEDURE — 3609027000 HC COLONOSCOPY: Performed by: SURGERY

## 2021-08-09 PROCEDURE — 7100000010 HC PHASE II RECOVERY - FIRST 15 MIN: Performed by: SURGERY

## 2021-08-09 PROCEDURE — 88305 TISSUE EXAM BY PATHOLOGIST: CPT

## 2021-08-09 PROCEDURE — 2709999900 HC NON-CHARGEABLE SUPPLY: Performed by: SURGERY

## 2021-08-09 PROCEDURE — 3700000000 HC ANESTHESIA ATTENDED CARE: Performed by: SURGERY

## 2021-08-09 PROCEDURE — 2580000003 HC RX 258: Performed by: SURGERY

## 2021-08-09 PROCEDURE — 7100000011 HC PHASE II RECOVERY - ADDTL 15 MIN: Performed by: SURGERY

## 2021-08-09 PROCEDURE — 6360000002 HC RX W HCPCS: Performed by: NURSE ANESTHETIST, CERTIFIED REGISTERED

## 2021-08-09 PROCEDURE — 3700000001 HC ADD 15 MINUTES (ANESTHESIA): Performed by: SURGERY

## 2021-08-09 RX ORDER — SODIUM CHLORIDE 9 MG/ML
25 INJECTION, SOLUTION INTRAVENOUS PRN
Status: DISCONTINUED | OUTPATIENT
Start: 2021-08-09 | End: 2021-08-09 | Stop reason: HOSPADM

## 2021-08-09 RX ORDER — LIDOCAINE HYDROCHLORIDE 10 MG/ML
INJECTION, SOLUTION EPIDURAL; INFILTRATION; INTRACAUDAL; PERINEURAL PRN
Status: DISCONTINUED | OUTPATIENT
Start: 2021-08-09 | End: 2021-08-09 | Stop reason: SDUPTHER

## 2021-08-09 RX ORDER — GLYCOPYRROLATE 1 MG/5 ML
SYRINGE (ML) INTRAVENOUS PRN
Status: DISCONTINUED | OUTPATIENT
Start: 2021-08-09 | End: 2021-08-09 | Stop reason: SDUPTHER

## 2021-08-09 RX ORDER — SUCRALFATE ORAL 1 G/10ML
1 SUSPENSION ORAL 4 TIMES DAILY
Qty: 420 ML | Refills: 1 | Status: SHIPPED | OUTPATIENT
Start: 2021-08-09 | End: 2022-07-13

## 2021-08-09 RX ORDER — SODIUM CHLORIDE, SODIUM LACTATE, POTASSIUM CHLORIDE, CALCIUM CHLORIDE 600; 310; 30; 20 MG/100ML; MG/100ML; MG/100ML; MG/100ML
INJECTION, SOLUTION INTRAVENOUS CONTINUOUS
Status: DISCONTINUED | OUTPATIENT
Start: 2021-08-09 | End: 2021-08-09 | Stop reason: HOSPADM

## 2021-08-09 RX ORDER — SODIUM CHLORIDE 0.9 % (FLUSH) 0.9 %
5-40 SYRINGE (ML) INJECTION PRN
Status: DISCONTINUED | OUTPATIENT
Start: 2021-08-09 | End: 2021-08-09 | Stop reason: HOSPADM

## 2021-08-09 RX ORDER — MAGNESIUM HYDROXIDE 1200 MG/15ML
LIQUID ORAL PRN
Status: DISCONTINUED | OUTPATIENT
Start: 2021-08-09 | End: 2021-08-09 | Stop reason: ALTCHOICE

## 2021-08-09 RX ORDER — SODIUM CHLORIDE 0.9 % (FLUSH) 0.9 %
10 SYRINGE (ML) INJECTION EVERY 12 HOURS SCHEDULED
Status: DISCONTINUED | OUTPATIENT
Start: 2021-08-09 | End: 2021-08-09 | Stop reason: HOSPADM

## 2021-08-09 RX ORDER — FENTANYL CITRATE 50 UG/ML
INJECTION, SOLUTION INTRAMUSCULAR; INTRAVENOUS PRN
Status: DISCONTINUED | OUTPATIENT
Start: 2021-08-09 | End: 2021-08-09 | Stop reason: SDUPTHER

## 2021-08-09 RX ORDER — SODIUM CHLORIDE 0.9 % (FLUSH) 0.9 %
5-40 SYRINGE (ML) INJECTION EVERY 12 HOURS SCHEDULED
Status: DISCONTINUED | OUTPATIENT
Start: 2021-08-09 | End: 2021-08-09 | Stop reason: HOSPADM

## 2021-08-09 RX ORDER — PROPOFOL 10 MG/ML
INJECTION, EMULSION INTRAVENOUS CONTINUOUS PRN
Status: DISCONTINUED | OUTPATIENT
Start: 2021-08-09 | End: 2021-08-09 | Stop reason: SDUPTHER

## 2021-08-09 RX ORDER — SODIUM CHLORIDE 0.9 % (FLUSH) 0.9 %
10 SYRINGE (ML) INJECTION PRN
Status: DISCONTINUED | OUTPATIENT
Start: 2021-08-09 | End: 2021-08-09 | Stop reason: HOSPADM

## 2021-08-09 RX ORDER — EPHEDRINE SULFATE 50 MG/ML
INJECTION, SOLUTION INTRAVENOUS PRN
Status: DISCONTINUED | OUTPATIENT
Start: 2021-08-09 | End: 2021-08-09 | Stop reason: SDUPTHER

## 2021-08-09 RX ORDER — MIDAZOLAM HYDROCHLORIDE 2 MG/2ML
INJECTION, SOLUTION INTRAMUSCULAR; INTRAVENOUS PRN
Status: DISCONTINUED | OUTPATIENT
Start: 2021-08-09 | End: 2021-08-09 | Stop reason: SDUPTHER

## 2021-08-09 RX ADMIN — SODIUM CHLORIDE, POTASSIUM CHLORIDE, SODIUM LACTATE AND CALCIUM CHLORIDE: 600; 310; 30; 20 INJECTION, SOLUTION INTRAVENOUS at 08:13

## 2021-08-09 RX ADMIN — FENTANYL CITRATE 100 MCG: 50 INJECTION, SOLUTION INTRAMUSCULAR; INTRAVENOUS at 09:47

## 2021-08-09 RX ADMIN — MIDAZOLAM HYDROCHLORIDE 2 MG: 2 INJECTION, SOLUTION INTRAMUSCULAR; INTRAVENOUS at 09:43

## 2021-08-09 RX ADMIN — Medication 0.4 MG: at 09:57

## 2021-08-09 RX ADMIN — LIDOCAINE HYDROCHLORIDE 50 MG: 10 INJECTION, SOLUTION EPIDURAL; INFILTRATION; INTRACAUDAL; PERINEURAL at 09:47

## 2021-08-09 RX ADMIN — EPHEDRINE SULFATE 10 MG: 50 INJECTION, SOLUTION INTRAVENOUS at 09:55

## 2021-08-09 RX ADMIN — EPHEDRINE SULFATE 10 MG: 50 INJECTION, SOLUTION INTRAVENOUS at 09:51

## 2021-08-09 RX ADMIN — PROPOFOL 100 MCG/KG/MIN: 10 INJECTION, EMULSION INTRAVENOUS at 09:47

## 2021-08-09 ASSESSMENT — PAIN - FUNCTIONAL ASSESSMENT: PAIN_FUNCTIONAL_ASSESSMENT: 0-10

## 2021-08-09 ASSESSMENT — PAIN SCALES - GENERAL
PAINLEVEL_OUTOF10: 0
PAINLEVEL_OUTOF10: 0

## 2021-08-09 NOTE — H&P
HPI     Mr Sánchez Ríos is a pleasant 60-year-old white male kindly referred to me by Pascual Padilla for evaluation of a long history of reflux symptoms and consideration of screening colonoscopy. He has had epigastric pain and heartburn for years. Typically well managed with Prevacid. Normal appetite. Daily bowel movements, formed and brown, without blood. No recent weight changes, 178 pounds, BMI 26. History of COVID-19 December 2020. He has completed his COVID-19 vaccinations. No cough, fever nor other respiratory symptoms. Father treated for colon cancer. Mother treated for breast cancer. Patient quit tobacco 2007.     Review of Systems   Constitutional: Negative for activity change, appetite change, chills, fever and unexpected weight change. HENT: Negative for nosebleeds, sneezing, sore throat and trouble swallowing. Eyes: Negative for visual disturbance. Respiratory: Negative for cough, choking and shortness of breath. Cardiovascular: Negative for chest pain, palpitations and leg swelling. Gastrointestinal: Positive for abdominal pain (epigastric with GERD). Negative for blood in stool, nausea and vomiting. Genitourinary: Negative for dysuria, flank pain and hematuria. Musculoskeletal: Positive for arthralgias. Negative for back pain, gait problem and myalgias. Allergic/Immunologic: Negative for immunocompromised state. Neurological: Negative for dizziness, seizures, syncope, weakness and headaches. Hematological: Does not bruise/bleed easily. Psychiatric/Behavioral: Negative for confusion and sleep disturbance.         Past Medical History        Past Medical History:   Diagnosis Date    GERD (gastroesophageal reflux disease)      Hyperlipidemia      Kidney stones              Past Surgical History   History reviewed.  No pertinent surgical history.        Family History         Family History   Problem Relation Age of Onset    Diabetes Mother      Cancer Mother           breast  High Blood Pressure Mother      Heart Failure Father      Cancer Father           colon    Heart Disease Father      Prostate Cancer Father      High Cholesterol Brother              Allergies:  See list     Current Facility-Administered Medications          Current Outpatient Medications   Medication Sig Dispense Refill    lansoprazole (PREVACID) 30 MG delayed release capsule Take 1 capsule by mouth daily 90 capsule 3    fenofibrate (TRICOR) 145 MG tablet TAKE 1 TABLET DAILY 90 tablet 3    acetaminophen (TYLENOL) 500 MG tablet Take 1,000 mg by mouth every 6 hours as needed for Pain        ibuprofen (ADVIL;MOTRIN) 200 MG tablet Take 800 mg by mouth every 6 hours as needed for Pain          No current facility-administered medications for this visit.            Social History   Social History            Socioeconomic History    Marital status:        Spouse name: None    Number of children: None    Years of education: None    Highest education level: None   Occupational History    None   Tobacco Use    Smoking status: Former Smoker       Packs/day: 1.00       Years: 10.00       Pack years: 10.00       Quit date: 2007       Years since quittin.4    Smokeless tobacco: Never Used   Substance and Sexual Activity    Alcohol use: Not Currently    Drug use: Never    Sexual activity: None   Other Topics Concern    None   Social History Narrative    None      Social Determinants of Health          Financial Resource Strain: Low Risk     Difficulty of Paying Living Expenses: Not very hard   Food Insecurity: No Food Insecurity    Worried About Running Out of Food in the Last Year: Never true    Neil of Food in the Last Year: Never true   Transportation Needs:     Lack of Transportation (Medical):      Lack of Transportation (Non-Medical):    Physical Activity:     Days of Exercise per Week:     Minutes of Exercise per Session:    Stress:     Feeling of Stress :    Social Connections:     Frequency of Communication with Friends and Family:     Frequency of Social Gatherings with Friends and Family:     Attends [employer] Services:     Active Member of Clubs or Organizations:     Attends Club or Organization Meetings:     Marital Status:    Intimate Partner Violence:     Fear of Current or Ex-Partner:     Emotionally Abused:     Physically Abused:     Sexually Abused:             BP (!) 146/88 (Site: Left Upper Arm, Position: Sitting, Cuff Size: Medium Adult)   Pulse 94   Temp 98.9 °F (37.2 °C) (Infrared)   Resp 18   Ht 5' 9\" (1.753 m)   Wt 178 lb (80.7 kg)   BMI 26.29 kg/m²       Physical Exam  Vitals and nursing note reviewed. Constitutional:       Appearance: He is well-developed. HENT:      Head: Normocephalic and atraumatic. Eyes:      General: No scleral icterus. Conjunctiva/sclera: Conjunctivae normal.      Pupils: Pupils are equal, round, and reactive to light. Neck:      Vascular: No JVD. Trachea: No tracheal deviation. Cardiovascular:      Rate and Rhythm: Normal rate and regular rhythm. Pulmonary:      Effort: Pulmonary effort is normal. No respiratory distress. Chest:      Chest wall: No tenderness. Abdominal:      General: There is no distension. Palpations: Abdomen is soft. There is no mass. Tenderness: There is no abdominal tenderness. There is no guarding or rebound. Musculoskeletal:         General: Normal range of motion. Cervical back: Normal range of motion and neck supple. Lymphadenopathy:      Cervical: No cervical adenopathy. Skin:     General: Skin is warm and dry. Findings: No erythema or rash. Neurological:      Mental Status: He is alert and oriented to person, place, and time. Cranial Nerves: No cranial nerve deficit. Psychiatric:         Behavior: Behavior normal.         Thought Content:  Thought content normal.         Judgment: Judgment normal.            ASSESSMENT       Diagnosis Orders   1. Gastroesophageal reflux disease, unspecified whether esophagitis present      2. BMI 26.0-26.9,adult      3. History of 2019 novel coronavirus disease (COVID-19)      4. Family history of colon cancer      5. Family history of breast cancer      6. Pre-op testing  EKG 12 Lead         PLAN     Previously discussed at length with Mr. Lloyd Harper his long history of epigastric pain with reflux symptoms, typically well managed with Prevacid. No significant interval changes since evaluation July 26, 2021. We will proceed with diagnostic EGD and screening colonoscopy. Risks, benefits, alternatives thoroughly reviewed and accepted by Mr. Mcmahan, including GI bleeding, perforation, missed lesions, COVID-19 exposure/infection, etc.

## 2021-08-09 NOTE — OP NOTE
[place] [id number]                                OPERATIVE REPORT    PATIENT NAME: Dwight Lehman                   :        1970  MED REC NO:   [id number]                              ROOM:  ACCOUNT NO:   [de-identified]                           ADMIT DATE: 2021  PROVIDER:     Cristy Nelson    DATE OF PROCEDURE:  2021    ATTENDING SURGEON:  Cristy Nelson MD    PCP:  Rachelle Bob MD    PREOPERATIVE DIAGNOSES:  1.  Epigastric pain with reflux symptoms. 2.  Screening colonoscopy. 3.  History of COVID-19, 2020.  4.  Family history of colon cancer (father). 5.  Family history of breast cancer (mother). POSTOPERATIVE DIAGNOSES:  1.  Small sliding type 1 hiatal hernia (2 cm). 2.  Minimal superficial gastritis without ulceration. 3.  Normal colon. OPERATION:  1. Esophagogastroduodenoscopy. 2.  Prepyloric antral biopsies. 3.  Colonoscopy, anus to cecum. ANESTHESIA:  MAC.    ESTIMATED BLOOD LOSS:  Less than 20 mL. INDICATIONS:  The patient is a pleasant 55-year-old white male  presenting with a long history of reflux symptoms and epigastric pain,  typically well managed with Prevacid. BMI 26. History of COVID-19,  2020. Father treated for colon cancer, mother treated for breast  cancer. The patient quit tobacco in . At this time, endoscopy is  indicated. OPERATIVE PROCEDURE:  After obtaining informed consent with discussion  of risks, benefits, and alternatives including a risk of GI bleeding,  perforation, missed lesions, COVID-19 exposure/infection, etc., the  patient was taken to the endoscopy suite and placed in the left lateral  recumbent position. Following adequate IV sedation, an endoscope was  passed over the tongue into the posterior oropharynx.   Vocal folds were  visualized and appeared normal.  Scope was directed into the esophagus  and onto the GE junction. Upper and mid esophagus appeared normal.  In  the distal esophagus, a small sliding type 1 hiatal hernia measuring  approximately 2 cm above the diaphragmatic hiatus was present. There  were no rings, no webs, no varices, no esophagitis, no strictures. The  stomach was entered, had normal distensibility. On retroflexion, the  fundus and cardia appeared normal.  The body and prepyloric antrum had  very mild superficial gastritis without ulceration. Prepyloric antral  biopsies were obtained. The pylorus was patent. The duodenum was  entered. First, second, and third portions of the duodenum appeared  normal.  The stomach was decompressed by suction as the scope was  removed. A digital rectal exam was performed. Sphincter tone was  normal.  Prostate was unremarkable. There were no discrete masses. A  colonoscope was passed transanally into the rectum and advanced with  gentle insufflation throughout the entirety of the colon to the cecum. Cecal position was confirmed by clear visualization of the ileocecal  valve, the appendiceal orifice, and transduction of manual pressure in  the right lower quadrant to the cecum. The Suprep bowel prep was  excellent. All colonic mucosa was clearly visible. The cecum,  ascending colon, and hepatic flexure were normal.  Transverse colon,  splenic flexure were also normal.  The descending colon and sigmoid were  a bit redundant, but otherwise normal.  Upper, middle, and lower  portions of the rectum were normal.  On retroflexion, there were minimal  internal hemorrhoids. The colon was decompressed by suction as the  scope was removed. No colonic biopsies were required. The patient  tolerated the procedure well and was transferred to PACU in stable  condition. SPECIMENS:  Prepyloric antral biopsies. DRAINS:  None. COMPLICATIONS:  None. DISPOSITION:  To PACU, awake, alert, and stable.   Following recovery, we  will discharge the patient home with gradual advancement of diet and  activity as tolerated with instructions for a healthy, balanced  high-fiber, low-fat diet with fiber supplementation, increased water  intake, physical activity, etc.  We will recommend next screening  colonoscopy in 5 years, age 55, given the patient's family history of  colon and breast cancer. We will continue proton pump inhibition. Consider a trial of Reglan if the patient's symptoms of epigastric  discomfort and reflux continue.         Charisma Hebert    D: 08/09/2021 10:13:24       T: 08/09/2021 10:15:47     [email or web address]  Job#: [id number]     Doc#: [id number]    CC:  Dev Villalobos

## 2021-08-09 NOTE — ANESTHESIA POSTPROCEDURE EVALUATION
Department of Anesthesiology  Postprocedure Note    Patient: Chang Kay  MRN: [id number]  YOB: 1970  Date of evaluation: 8/9/2021  Time:  10:21 AM     Procedure Summary     Date: 08/09/21 Room / Location: [address] [place] [address] ENDOSCOPY    Anesthesia Start: 1022 Anesthesia Stop: 4632    Procedures:       COLONOSCOPY (N/A Abdomen)      EGD BIOPSY (N/A ) Diagnosis: (GERD SCREENING, COLONOSCOPY FAMILY HISTORY COLON CANCER)    Surgeons: Christian Quinteros MD Responsible Provider: TITA Willis CRNA    Anesthesia Type: MAC ASA Status: 2          Anesthesia Type: MAC      Yessica Phase II: Yessica Score: 9    Last vitals: Reviewed and per EMR flowsheets.        Anesthesia Post Evaluation    Patient location during evaluation: bedside  Patient participation: complete - patient participated  Level of consciousness: awake and alert  Pain score: 0  Airway patency: patent  Nausea & Vomiting: no nausea and no vomiting  Complications: no  Cardiovascular status: hemodynamically stable and blood pressure returned to baseline  Respiratory status: acceptable and spontaneous ventilation  Hydration status: euvolemic

## 2021-08-09 NOTE — BRIEF OP NOTE
Brief Postoperative Note      Patient: Sehfali Chavis  YOB: 1970  MRN: [id number]    Date of Procedure: 8/9/2021    Pre-Op Diagnosis:      1. GERD symptoms     2. History COVID-19 Dec 2020     3. Family history colon cancer (father)     3. Family history breast cancer (mother)    Post-Op Diagnosis:     1. Small hiatal hernia     2. Minimal superficial gastritis     3. Normal colon       Operation:     1. EGD     2. Antral biopsies     3. Colonoscopy anus to cecum    Surgeon(s):  Carlota Washburn MD    Assistant:  * No surgical staff found *    Anesthesia: Monitor Anesthesia Care    Estimated Blood Loss (mL): less than 12XB     Complications: None    Specimens:   ID Type Source Tests Collected by Time Destination   A : A. ANTRUM BIOPSY X3 Tissue Stomach SURGICAL PATHOLOGY Carlota Washburn MD 8/9/2021 4691      Findings:  As above.     Dictated # [id number]    Electronically signed by Carlota Washburn MD on 8/9/2021 at 10:05 AM

## 2021-08-09 NOTE — ANESTHESIA PRE PROCEDURE
Department of Anesthesiology  Preprocedure Note       Name:  Christine Bassett   Age:  49 y.o.  :  1970                                          MRN:  [id number]         Date:  2021      Surgeon: Shaunna Silvestre):  Jarrell Meneses MD    Procedure: Procedure(s):  COLONOSCOPY  EGD    Medications prior to admission:   Prior to Admission medications    Medication Sig Start Date End Date Taking?  Authorizing Provider   lansoprazole (PREVACID) 30 MG delayed release capsule Take 1 capsule by mouth daily 21  Yes Rosas Churchill MD   fenofibrate (TRICOR) 145 MG tablet TAKE 1 TABLET DAILY 21  Yes Rosas Churchill MD   acetaminophen (TYLENOL) 500 MG tablet Take 1,000 mg by mouth every 6 hours as needed for Pain   Yes Historical Provider, MD   ibuprofen (ADVIL;MOTRIN) 200 MG tablet Take 800 mg by mouth every 6 hours as needed for Pain   Yes Historical Provider, MD       Current medications:    Current Facility-Administered Medications   Medication Dose Route Frequency Provider Last Rate Last Admin    lactated ringers infusion   Intravenous Continuous Jarrell Meneses  mL/hr at 21 0813 New Bag at 21 0813    sodium chloride flush 0.9 % injection 5-40 mL  5-40 mL Intravenous 2 times per day Jarrell Meneses MD        sodium chloride flush 0.9 % injection 5-40 mL  5-40 mL Intravenous PRN Jarrell Meneses MD        0.9 % sodium chloride infusion  25 mL Intravenous PRN Jarrell Meneses MD           Allergies:  No Known Allergies    Problem List:    Patient Active Problem List   Diagnosis Code    Pure hypercholesterolemia E78.00    Pure hyperglyceridemia E78.1    Esophageal reflux K21.9    Rectus diastasis M62.08       Past Medical History:        Diagnosis Date    GERD (gastroesophageal reflux disease)     Hyperlipidemia     Kidney stones        Past Surgical History:        Procedure Laterality Date    JOINT REPLACEMENT      right knee       Social History:    Social History     Tobacco Use  Smoking status: Former Smoker     Packs/day: 1.00     Years: 10.00     Pack years: 10.00     Quit date: 2007     Years since quittin.4    Smokeless tobacco: Never Used   Substance Use Topics    Alcohol use: Not Currently                                Counseling given: Not Answered      Vital Signs (Current):   Vitals:    21 0946 21 0803   BP:  (!) 141/84   Pulse:  65   Resp:  18   Temp:  36.7 °C (98 °F)   TempSrc:  Temporal   SpO2:  99%   Weight: 175 lb (79.4 kg)    Height: 5' 9\" (1.753 m)                                               BP Readings from Last 3 Encounters:   21 (!) 141/84   21 (!) 146/88   21 136/84       NPO Status: Time of last liquid consumption: 1900 (last of Bowel prep 0400)                        Time of last solid consumption: 2100                        Date of last liquid consumption: 21                        Date of last solid food consumption: 21    BMI:   Wt Readings from Last 3 Encounters:   21 175 lb (79.4 kg)   21 178 lb (80.7 kg)   21 177 lb (80.3 kg)     Body mass index is 25.84 kg/m². CBC:   Lab Results   Component Value Date    WBC 11.1 2018    RBC 4.81 2018    HGB 14.1 2018    HCT 42.2 2018    MCV 87.9 2018    RDW 14.1 2018     2018       CMP:   Lab Results   Component Value Date     2018    K 3.9 2018     2018    CO2 24 2018    BUN 15 2018    CREATININE 0.84 2018    GFRAA >60 2018    LABGLOM >60 2018    GLUCOSE 91 2021    PROT 7.6 2017    CALCIUM 10.0 2018    BILITOT 0.35 2017    ALKPHOS 52 2017    AST 23 2017    ALT 34 2017       POC Tests: No results for input(s): POCGLU, POCNA, POCK, POCCL, POCBUN, POCHEMO, POCHCT in the last 72 hours.     Coags: No results found for: PROTIME, INR, APTT    HCG (If Applicable): No results found for: PREGTESTUR, PREGSERUM, HCG, HCGQUANT     ABGs: No results found for: PHART, PO2ART, SED9FVU, MKK4UBC, BEART, M8WWEGND     Type & Screen (If Applicable):  No results found for: LABABO, LABRH    Drug/Infectious Status (If Applicable):  No results found for: HIV, HEPCAB    COVID-19 Screening (If Applicable):   Lab Results   Component Value Date    COVID19 Detected 12/12/2020           Anesthesia Evaluation  Patient summary reviewed and Nursing notes reviewed no history of anesthetic complications:   Airway: Mallampati: III  TM distance: >3 FB   Neck ROM: full  Mouth opening: > = 3 FB Dental: normal exam         Pulmonary:Negative Pulmonary ROS and normal exam  breath sounds clear to auscultation                             Cardiovascular:Negative CV ROS          ECG reviewed  Rhythm: regular  Rate: normal                    Neuro/Psych:   (+) neuromuscular disease:,             GI/Hepatic/Renal:   (+) GERD:,           Endo/Other: Negative Endo/Other ROS                    Abdominal:             Vascular: negative vascular ROS. Other Findings:             Anesthesia Plan      MAC     ASA 2             Anesthetic plan and risks discussed with patient. Use of blood products discussed with patient whom. Plan discussed with attending.                   TITA Sutherland - CRNA   8/9/2021

## 2021-08-10 LAB — SURGICAL PATHOLOGY REPORT: NORMAL

## 2021-08-16 ENCOUNTER — OFFICE VISIT (OUTPATIENT)
Dept: SURGERY | Age: 51
End: 2021-08-16
Payer: COMMERCIAL

## 2021-08-16 DIAGNOSIS — K44.9 HIATAL HERNIA: ICD-10-CM

## 2021-08-16 DIAGNOSIS — Z86.16 HISTORY OF 2019 NOVEL CORONAVIRUS DISEASE (COVID-19): ICD-10-CM

## 2021-08-16 DIAGNOSIS — Z98.890 S/P COLONOSCOPY: Primary | ICD-10-CM

## 2021-08-16 DIAGNOSIS — Z80.3 FAMILY HISTORY OF BREAST CANCER: ICD-10-CM

## 2021-08-16 DIAGNOSIS — K21.9 GASTROESOPHAGEAL REFLUX DISEASE WITHOUT ESOPHAGITIS: ICD-10-CM

## 2021-08-16 DIAGNOSIS — K29.30 CHRONIC SUPERFICIAL GASTRITIS WITHOUT BLEEDING: ICD-10-CM

## 2021-08-16 DIAGNOSIS — Z80.0 FAMILY HISTORY OF COLON CANCER: ICD-10-CM

## 2021-08-16 PROCEDURE — 99212 OFFICE O/P EST SF 10 MIN: CPT | Performed by: SURGERY

## 2021-08-16 ASSESSMENT — ENCOUNTER SYMPTOMS
TROUBLE SWALLOWING: 0
COUGH: 0
BACK PAIN: 0
SHORTNESS OF BREATH: 0
ABDOMINAL PAIN: 0
SORE THROAT: 0
CHOKING: 0
NAUSEA: 0
BLOOD IN STOOL: 0
VOMITING: 0

## 2021-08-16 NOTE — PROGRESS NOTES
Arlette Peña MD  General Surgery, Endoscopy  Chief Medical Officer    [facility], [place] Mary Poe  [address] [place] [address]  Dept: [phone]  Fax: [phone] Miranda Diaz  Chief Complaint   Patient presents with    Follow Up After Procedure     f/u egd, colonoscopy 08/09/21. HPI    Mr Laurian Boxer presents for telephone follow-up after endoscopy. DATE OF PROCEDURE:  08/09/2021     ATTENDING SURGEON:  Devon Ray MD     PCP:  Hanna Chanel MD     PREOPERATIVE DIAGNOSES:  1.  Epigastric pain with reflux symptoms. 2.  Screening colonoscopy. 3.  History of COVID-19, 12/2020.  4.  Family history of colon cancer (father). 5.  Family history of breast cancer (mother).     POSTOPERATIVE DIAGNOSES:  1.  Small sliding type 1 hiatal hernia (2 cm). 2.  Minimal superficial gastritis without ulceration. 3.  Normal colon.     OPERATION:  1. Esophagogastroduodenoscopy. 2.  Prepyloric antral biopsies. 3.  Colonoscopy, anus to cecum. He is doing well. Epigastric pain has resolved. Not taking motrin at this time. No new complaints. Review of Systems   Constitutional: Negative for activity change, appetite change, chills, fever and unexpected weight change. HENT: Negative for nosebleeds, sneezing, sore throat and trouble swallowing. Eyes: Negative for visual disturbance. Respiratory: Negative for cough, choking and shortness of breath. Cardiovascular: Negative for chest pain, palpitations and leg swelling. Gastrointestinal: Negative for abdominal pain, blood in stool, nausea and vomiting. Genitourinary: Negative for dysuria, flank pain and hematuria. Musculoskeletal: Positive for arthralgias. Negative for back pain, gait problem and myalgias. Allergic/Immunologic: Negative for immunocompromised state. Neurological: Negative for dizziness, seizures, syncope, weakness and headaches. Hematological: Does not bruise/bleed easily. Psychiatric/Behavioral: Negative for confusion and sleep disturbance. Past Medical History:   Diagnosis Date    GERD (gastroesophageal reflux disease)     Hyperlipidemia     Kidney stones        Past Surgical History:   Procedure Laterality Date    COLONOSCOPY N/A 2021    COLON[facility] by Juvenal Sanders MD at [facility]      right knee    UPPER GASTROINTESTINAL ENDOSCOPY N/A 2021    EGD BIOPSY performed by Juvenal Sanders MD at [facility] ENDOSCOPY       Family History   Problem Relation Age of Onset    Diabetes Mother     Cancer Mother         breast    High Blood Pressure Mother     Heart Failure Father     Cancer Father         colon    Heart Disease Father     Prostate Cancer Father     High Cholesterol Brother        Allergies:  See list    Current Outpatient Medications   Medication Sig Dispense Refill    sucralfate (CARAFATE) 1 GM/10ML suspension Take 10 mLs by mouth 4 times daily Substitute 1 g Carafate tablets and instruct patient how to create slurry at home. 420 mL 1    lansoprazole (PREVACID) 30 MG delayed release capsule Take 1 capsule by mouth daily 90 capsule 3    fenofibrate (TRICOR) 145 MG tablet TAKE 1 TABLET DAILY 90 tablet 3    acetaminophen (TYLENOL) 500 MG tablet Take 1,000 mg by mouth every 6 hours as needed for Pain      ibuprofen (ADVIL;MOTRIN) 200 MG tablet Take 800 mg by mouth every 6 hours as needed for Pain       No current facility-administered medications for this visit.        Social History     Socioeconomic History    Marital status:      Spouse name: Not on file    Number of children: Not on file    Years of education: Not on file    Highest education level: Not on file   Occupational History    Not on file   Tobacco Use    Smoking status: Former Smoker     Packs/day: 1.00     Years: 10.00     Pack years: 10.00     Quit date: 2007     Years since quittin.4    Smokeless tobacco: Never Used Substance and Sexual Activity    Alcohol use: Not Currently    Drug use: Never    Sexual activity: Not on file   Other Topics Concern    Not on file   Social History Narrative    Not on file     Social Determinants of Health     Financial Resource Strain: Low Risk     Difficulty of Paying Living Expenses: Not very hard   Food Insecurity: No Food Insecurity    Worried About Running Out of Food in the Last Year: Never true    Neil of Food in the Last Year: Never true   Transportation Needs:     Lack of Transportation (Medical):  Lack of Transportation (Non-Medical):    Physical Activity:     Days of Exercise per Week:     Minutes of Exercise per Session:    Stress:     Feeling of Stress :    Social Connections:     Frequency of Communication with Friends and Family:     Frequency of Social Gatherings with Friends and Family:     Attends [employer]ism Services:     Active Member of Clubs or Organizations:     Attends Club or Organization Meetings:     Marital Status:    Intimate Partner Violence:     Fear of Current or Ex-Partner:     Emotionally Abused:     Physically Abused:     Sexually Abused: There were no vitals taken for this visit. Physical Exam    IMAGING/LABS    8/10/2021 12:55 PM - Tiarra Chin Incoming Lab Results From [place]    Component Collected Lab   Surgical Pathology Report 08/09/2021  2:[address]  [address]   -- Diagnosis --   STOMACH, ANTRUM, BIOPSIES:   - MILD CHRONIC GASTRITIS.   - NEGATIVE FOR HELICOBACTER PYLORI INFECTION AND INTESTINAL   METAPLASIA.        Adrián Castaneda Cos,   **Electronically Signed Out**         sls/8/10/2021         Clinical Information   Pre-op Diagnosis:  GERD, SCREENING COLONOSCOPY, FAMILY HISTORY COLON   CANCER   Operative Findings:  ANTRUM BIOPSY x 3   Operation Performed:  COLONOSCOPY, EGD BIOPSY     Source of Specimen   1: ANTRUM BIOPSY x 3 (A)     Gross Description   \"YANELY INMAN, ANTRUM BIOPSY x 3\" Four tan-white tissue fragments   from 0.2 to 0.3 cm and are 0.5 x 0.5 x 0.2 cm in aggregate.  Entirely   1cs.  lm tm       Microscopic Description   Microscopic examination performed. SURGICAL PATHOLOGY CONSULTATION         Patient Name: Shine Srivastava [facility] Rec: [id number]   Path Number: [id number]     [facility]   [facility]   ANATOMIC PATHOLOGY   [address] [place] [address],  [address]. [place], [address]   [phone]   Fax: [phone]    Testing Performed By    Court Waller Yarely Name Director Address Valid Date Range   [address] [address] 08/30/17 0801-Present   Lab and Collection    Surgical Pathology - 8/9/2021  Result Information    Status: Final result (Collected: 8/9/2021 14:44) Provider Status: Reviewed   All Reviewers List    Juvenal Sanders MD on 8/11/2021 13:23   Svetlana Jackson MD on 8/10/2021 23:57   Routing History    Priority Sent On From To Message Type    8/10/2021 12:55 PM Giuseppe, [facility] Incoming Lab Results From Eder Martini MD Results    8/10/2021 12:55 PM Giuseppe, [facility] Incoming Lab Results From Claus Ascencio MD          ASSESSMENT     Diagnosis Orders   1. S/P colonoscopy     2. Hiatal hernia     3. Chronic superficial gastritis without bleeding     4. Gastroesophageal reflux disease without esophagitis     5. BMI 26.0-26.9,adult     6. History of 2019 novel coronavirus disease (COVID-19)     7. Family history of colon cancer     8. Family history of breast cancer         PLAN    EGD and colonoscopy findings and pathology reviewed with Mr. Sánchez Ríos. Epigastric pain and reflux symptoms have resolved. Continue Prevacid on a daily basis and Carafate as needed. He has taken proton pump inhibitors for several years. Recommend discontinuing PPI every 6 to 12 months for a period of 2 or 3 weeks, treating any breakthrough epigastric pain and reflux symptoms by other means.   If managed successfully, discontinue PPI entirely. He is no longer taking Motrin since his knee pain has resolved. Recommend next screening colonoscopy in 5 years, age 55 given his father's history of colon cancer.   Discussed importance of a healthy, balanced high-fiber low-fat diet with fiber supplementation, increased water intake, increased physical activity, decreased sugar, etc.     Freda Spence MD

## 2021-08-16 NOTE — LETTER
[address]  [address] [facility] [address]  Phone: [phone]  Fax: [phone]    Cathy Hernández MD    August 16, 2021     Rebecca Ruelas MD  [address] [address]    Patient: Socorro Ortiz   MR Number: [id number]   YOB: 1970   Date of Visit: 8/16/2021       Dear Rebecca Ruelas: Thank you for referring Nan Russ to me for evaluation/treatment. Below are the relevant portions of my assessment and plan of care. ASSESSMENT     Diagnosis Orders   1. S/P colonoscopy     2. Hiatal hernia     3. Chronic superficial gastritis without bleeding     4. Gastroesophageal reflux disease without esophagitis     5. BMI 26.0-26.9,adult     6. History of 2019 novel coronavirus disease (COVID-19)     7. Family history of colon cancer     8. Family history of breast cancer         PLAN    EGD and colonoscopy findings and pathology reviewed with Mr. Aime Beckman. Epigastric pain and reflux symptoms have resolved. Continue Prevacid on a daily basis and Carafate as needed. He has taken proton pump inhibitors for several years. Recommend discontinuing PPI every 6 to 12 months for a period of 2 or 3 weeks, treating any breakthrough epigastric pain and reflux symptoms by other means. If managed successfully, discontinue PPI entirely. He is no longer taking Motrin since his knee pain has resolved. Recommend next screening colonoscopy in 5 years, age 55 given his father's history of colon cancer. Discussed importance of a healthy, balanced high-fiber low-fat diet with fiber supplementation, increased water intake, increased physical activity, decreased sugar, etc.     If you have questions, please do not hesitate to call me. I look forward to following Ilda aGrcia along with you.     Sincerely,    MD Cathy Black MD

## 2021-08-16 NOTE — PATIENT INSTRUCTIONS
Patient Education        Hiatal Hernia: Care Instructions  Your Care Instructions  A hiatal hernia occurs when part of the stomach bulges into the chest cavity. A hiatal hernia may allow stomach acid and juices to back up into the esophagus (acid reflux). This can cause a feeling of burning, warmth, heat, or pain behind the breastbone. This feeling may often occur after you eat, soon after you lie down, or when you bend forward, and it may come and go. You also may have a sour taste in your mouth. These symptoms are commonly known as heartburn or reflux. But not all hiatal hernias cause symptoms. Follow-up care is a key part of your treatment and safety. Be sure to make and go to all appointments, and call your doctor if you are having problems. It's also a good idea to know your test results and keep a list of the medicines you take. How can you care for yourself at home? · Take your medicines exactly as prescribed. Call your doctor if you think you are having a problem with your medicine. · Do not take aspirin or other nonsteroidal anti-inflammatory drugs (NSAIDs), such as ibuprofen (Advil, Motrin) or naproxen (Aleve), unless your doctor says it is okay. Ask your doctor what you can take for pain. · Your doctor may recommend over-the-counter medicine. For mild or occasional indigestion, antacids such as Tums, Gaviscon, Maalox, or Mylanta may help. Your doctor also may recommend over-the-counter acid reducers, such as famotidine (Pepcid AC), cimetidine (Tagamet HB), or omeprazole (Prilosec). Read and follow all instructions on the label. If you use these medicines often, talk with your doctor. · Change your eating habits. ? It's best to eat several small meals instead of two or three large meals. ? After you eat, wait 2 to 3 hours before you lie down. Late-night snacks aren't a good idea. ? Chocolate, mint, and alcohol can make heartburn worse.  They relax the valve between the esophagus and the stomach. ? Spicy foods, foods that have a lot of acid (like tomatoes and oranges), and coffee can make heartburn symptoms worse in some people. If your symptoms are worse after you eat a certain food, you may want to stop eating that food to see if your symptoms get better. · Do not smoke or chew tobacco.  · If you get heartburn at night, raise the head of your bed 6 to 8 inches by putting the frame on blocks or placing a foam wedge under the head of your mattress. (Adding extra pillows does not work.)  · Do not wear tight clothing around your middle. · Lose weight if you need to. Losing just 5 to 10 pounds can help. When should you call for help? Call your doctor now or seek immediate medical care if:    · You have new or worse belly pain.     · You are vomiting. Watch closely for changes in your health, and be sure to contact your doctor if:    · You have new or worse symptoms of indigestion.     · You have trouble or pain swallowing.     · You are losing weight.     · You do not get better as expected. Where can you learn more? Go to https://Tumbie.[employer]. org and sign in to your [employer] account. Enter J719 in the Verican box to learn more about \"Hiatal Hernia: Care Instructions. \"     If you do not have an account, please click on the \"Sign Up Now\" link. Current as of: February 10, 2021               Content Version: 12.9  © 5801-7620 [employer], [facility]. Care instructions adapted under license by [facility] ([facility]). If you have questions about a medical condition or this instruction, always ask your healthcare professional. [place] [address] any warranty or liability for your use of this information.

## 2021-09-29 ENCOUNTER — HOSPITAL ENCOUNTER (OUTPATIENT)
Dept: GENERAL RADIOLOGY | Age: 51
Discharge: HOME | End: 2021-10-01
Payer: COMMERCIAL

## 2021-09-29 ENCOUNTER — HOSPITAL ENCOUNTER (OUTPATIENT)
Age: 51
Discharge: HOME | End: 2021-10-01
Payer: COMMERCIAL

## 2021-09-29 DIAGNOSIS — M17.11 OSTEOARTHRITIS OF RIGHT KNEE, UNSPECIFIED OSTEOARTHRITIS TYPE: ICD-10-CM

## 2021-09-29 PROCEDURE — 73564 X-RAY EXAM KNEE 4 OR MORE: CPT

## 2022-03-19 ENCOUNTER — E-VISIT (OUTPATIENT)
Dept: PRIMARY CARE CLINIC | Age: 52
End: 2022-03-19
Payer: COMMERCIAL

## 2022-03-19 DIAGNOSIS — R05.9 COUGH: Primary | ICD-10-CM

## 2022-03-19 PROCEDURE — 99421 OL DIG E/M SVC 5-10 MIN: CPT | Performed by: NURSE PRACTITIONER

## 2022-03-19 ASSESSMENT — LIFESTYLE VARIABLES
SMOKING_STATUS: NO, BUT I USED TO SMOKE
PACKS_PER_DAY: 1
SMOKING_YEARS: 15

## 2022-03-21 RX ORDER — BENZONATATE 200 MG/1
200 CAPSULE ORAL 3 TIMES DAILY PRN
Qty: 30 CAPSULE | Refills: 0 | Status: SHIPPED | OUTPATIENT
Start: 2022-03-21 | End: 2022-03-31

## 2022-03-21 NOTE — PROGRESS NOTES
Reviewed questions from delbert submitted:  Positives:  Cough X 2 weeks and is not getting better  Nothing noted that patient is taking anything OTC  Former smoker     DX-Cough    Plan and treatment:  Benzonatate 200 mg 1 tab TID  See patient instructions  Follow up with PCP if symptoms do not improve or if worsen      I have spent 10 min reviewing chart, MAR, allergies, reviewing questions, Dx, and developing a plan of care

## 2022-03-23 LAB
CHOLESTEROL, TOTAL: 213 MG/DL
CHOLESTEROL/HDL RATIO: 3
GLUCOSE BLD-MCNC: 99 MG/DL
HDLC SERPL-MCNC: 70 MG/DL (ref 35–70)
LDL CHOLESTEROL CALCULATED: 115 MG/DL (ref 0–160)
NONHDLC SERPL-MCNC: NORMAL MG/DL
TRIGL SERPL-MCNC: 140 MG/DL
VLDLC SERPL CALC-MCNC: NORMAL MG/DL

## 2022-04-13 ENCOUNTER — HOSPITAL ENCOUNTER (OUTPATIENT)
Dept: GENERAL RADIOLOGY | Age: 52
Discharge: HOME | End: 2022-04-15
Payer: COMMERCIAL

## 2022-04-13 ENCOUNTER — HOSPITAL ENCOUNTER (OUTPATIENT)
Age: 52
Discharge: HOME | End: 2022-04-15
Payer: COMMERCIAL

## 2022-04-13 DIAGNOSIS — M17.11 ARTHRITIS OF KNEE, RIGHT: ICD-10-CM

## 2022-04-13 PROCEDURE — 73564 X-RAY EXAM KNEE 4 OR MORE: CPT

## 2022-07-13 ENCOUNTER — OFFICE VISIT (OUTPATIENT)
Dept: FAMILY MEDICINE CLINIC | Age: 52
End: 2022-07-13
Payer: COMMERCIAL

## 2022-07-13 VITALS
BODY MASS INDEX: 26.22 KG/M2 | HEART RATE: 72 BPM | HEIGHT: 69 IN | SYSTOLIC BLOOD PRESSURE: 130 MMHG | OXYGEN SATURATION: 96 % | DIASTOLIC BLOOD PRESSURE: 86 MMHG | WEIGHT: 177 LBS

## 2022-07-13 DIAGNOSIS — E78.00 PURE HYPERCHOLESTEROLEMIA: ICD-10-CM

## 2022-07-13 DIAGNOSIS — K21.9 GASTROESOPHAGEAL REFLUX DISEASE WITHOUT ESOPHAGITIS: ICD-10-CM

## 2022-07-13 PROCEDURE — 99213 OFFICE O/P EST LOW 20 MIN: CPT | Performed by: FAMILY MEDICINE

## 2022-07-13 RX ORDER — LANSOPRAZOLE 30 MG/1
30 CAPSULE, DELAYED RELEASE ORAL DAILY
Qty: 90 CAPSULE | Refills: 3 | Status: SHIPPED | OUTPATIENT
Start: 2022-07-13

## 2022-07-13 RX ORDER — FENOFIBRATE 145 MG/1
TABLET, COATED ORAL
Qty: 90 TABLET | Refills: 3 | Status: SHIPPED | OUTPATIENT
Start: 2022-07-13

## 2022-07-13 ASSESSMENT — ENCOUNTER SYMPTOMS
NAUSEA: 0
EYE DISCHARGE: 0
COUGH: 0
EYE REDNESS: 0
SORE THROAT: 0
SHORTNESS OF BREATH: 0
FACIAL SWELLING: 0
DIARRHEA: 0
CHOKING: 0

## 2022-07-13 ASSESSMENT — PATIENT HEALTH QUESTIONNAIRE - PHQ9
SUM OF ALL RESPONSES TO PHQ QUESTIONS 1-9: 0
SUM OF ALL RESPONSES TO PHQ QUESTIONS 1-9: 0
1. LITTLE INTEREST OR PLEASURE IN DOING THINGS: 0
2. FEELING DOWN, DEPRESSED OR HOPELESS: 0
SUM OF ALL RESPONSES TO PHQ QUESTIONS 1-9: 0
SUM OF ALL RESPONSES TO PHQ QUESTIONS 1-9: 0
SUM OF ALL RESPONSES TO PHQ9 QUESTIONS 1 & 2: 0

## 2022-07-13 NOTE — PROGRESS NOTES
HPI Notes    Name: Nettie Jean  : 1970        Chief Complaint:     Chief Complaint   Patient presents with    Hyperlipidemia     annual exam. Discuss labs.  Gastroesophageal Reflux       History of Present Illness:     Nettie Jean is a 47 y.o.  male who presents with Hyperlipidemia (annual exam. Discuss labs. ) and Gastroesophageal Reflux      Hyperlipidemia  This is a chronic problem. The current episode started more than 1 year ago. The problem is controlled. Recent lipid tests were reviewed and are normal. He has no history of diabetes or hypothyroidism. There are no known factors aggravating his hyperlipidemia. Pertinent negatives include no chest pain or shortness of breath. Current antihyperlipidemic treatment includes fibric acid derivatives. The current treatment provides significant improvement of lipids. Risk factors for coronary artery disease include dyslipidemia and male sex. Gastroesophageal Reflux  He reports no chest pain, no choking, no coughing, no dysphagia, no early satiety, no nausea or no sore throat. This is a chronic problem. The current episode started more than 1 year ago. The problem has been unchanged. Pertinent negatives include no melena or weight loss. He has tried a PPI for the symptoms. The treatment provided significant relief.        Past Medical History:     Past Medical History:   Diagnosis Date    GERD (gastroesophageal reflux disease)     Hyperlipidemia     Kidney stones       Reviewed all health maintenance requirements and ordered appropriate tests  Health Maintenance Due   Topic Date Due    HIV screen  Never done    Hepatitis C screen  Never done    Shingles vaccine (1 of 2) Never done       Past Surgical History:     Past Surgical History:   Procedure Laterality Date    COLONOSCOPY N/A 2021    COLONOSCOPY performed by Turner Merida MD at [address] [facility]      right knee    UPPER GASTROINTESTINAL ENDOSCOPY N/A 8/9/2021    EGD BIOPSY performed by Nicolas Nichols MD at [facility] ENDOSCOPY        Medications:       Prior to Admission medications    Medication Sig Start Date End Date Taking? Authorizing Provider   lansoprazole (PREVACID) 30 MG delayed release capsule Take 1 capsule by mouth daily 7/13/22  Yes Miguel Eric MD   fenofibrate (TRICOR) 145 MG tablet TAKE 1 TABLET DAILY 7/13/22  Yes Miguel Eric MD   acetaminophen (TYLENOL) 500 MG tablet Take 1,000 mg by mouth every 6 hours as needed for Pain    Historical Provider, MD   ibuprofen (ADVIL;MOTRIN) 200 MG tablet Take 800 mg by mouth every 6 hours as needed for Pain    Historical Provider, MD        Allergies:       Patient has no known allergies. Social History:     Tobacco:    reports that he quit smoking about 15 years ago. He has a 10.00 pack-year smoking history. He has never used smokeless tobacco.  Alcohol:      reports previous alcohol use. Drug Use:  reports no history of drug use. Family History:     Family History   Problem Relation Age of Onset    Diabetes Mother     Cancer Mother         breast    High Blood Pressure Mother     Heart Failure Father     Cancer Father         colon    Heart Disease Father     Prostate Cancer Father     High Cholesterol Brother        Review of Systems:       Review of Systems   Constitutional: Negative for chills, fever and weight loss. HENT: Negative for facial swelling and sore throat. Eyes: Negative for discharge and redness. Respiratory: Negative for cough, choking and shortness of breath. Cardiovascular: Negative for chest pain, palpitations and leg swelling. Gastrointestinal: Negative for diarrhea, dysphagia, melena and nausea. Genitourinary: Negative for difficulty urinating. Musculoskeletal: Negative for joint swelling. Skin: Negative for pallor and rash. Neurological: Negative for dizziness and facial asymmetry. Psychiatric/Behavioral: Negative for sleep disturbance. Physical Exam:     Physical Exam  Vitals reviewed. Constitutional:       General: He is not in acute distress. Appearance: Normal appearance. He is well-developed. He is not ill-appearing. HENT:      Head: Normocephalic and atraumatic. Eyes:      General:         Right eye: No discharge. Left eye: No discharge. Conjunctiva/sclera: Conjunctivae normal.   Neck:      Thyroid: No thyromegaly. Vascular: No carotid bruit. Cardiovascular:      Rate and Rhythm: Normal rate and regular rhythm. Heart sounds: Normal heart sounds. No murmur heard. Pulmonary:      Effort: Pulmonary effort is normal.      Breath sounds: Normal breath sounds. Abdominal:      General: There is no distension. Palpations: Abdomen is soft. Tenderness: There is no abdominal tenderness. Musculoskeletal:      Cervical back: Neck supple. Right lower leg: No edema. Left lower leg: No edema. Lymphadenopathy:      Cervical: No cervical adenopathy. Skin:     Findings: No rash. Neurological:      Mental Status: He is alert and oriented to person, place, and time.    Psychiatric:         Mood and Affect: Mood normal.         Vitals:  /86 (Site: Right Upper Arm, Position: Sitting, Cuff Size: Large Adult)   Pulse 72   Ht 5' 9\" (1.753 m)   Wt 177 lb (80.3 kg)   SpO2 96%   BMI 26.14 kg/m²       Data:     Lab Results   Component Value Date/Time     09/03/2018 03:12 PM    K 3.9 09/03/2018 03:12 PM     09/03/2018 03:12 PM    CO2 24 09/03/2018 03:12 PM    BUN 15 09/03/2018 03:12 PM    CREATININE 0.84 09/03/2018 03:12 PM    GLUCOSE 91 03/22/2021 12:00 AM    PROT 7.6 08/24/2017 06:50 AM    LABALBU 4.5 08/24/2017 06:50 AM    LABALBU 4.7 02/14/2012 10:19 AM    BILITOT 0.35 08/24/2017 06:50 AM    ALKPHOS 52 08/24/2017 06:50 AM    AST 23 08/24/2017 06:50 AM    ALT 34 08/24/2017 06:50 AM     Lab Results   Component Value Date/Time    WBC 11.1 09/03/2018 03:12 PM    RBC 4.81 09/03/2018 03:12 PM    HGB 14.1 09/03/2018 03:12 PM    HCT 42.2 09/03/2018 03:12 PM    MCV 87.9 09/03/2018 03:12 PM    MCH 29.2 09/03/2018 03:12 PM    MCHC 33.3 09/03/2018 03:12 PM    RDW 14.1 09/03/2018 03:12 PM     09/03/2018 03:12 PM    MPV NOT REPORTED 09/03/2018 03:12 PM     Lab Results   Component Value Date/Time    TSH 4.06 08/27/2015 08:24 AM     Lab Results   Component Value Date/Time    CHOL 195 03/22/2021 12:00 AM    HDL 67 03/22/2021 12:00 AM    PSA 0.25 07/14/2021 03:14 PM          Assessment/Plan:        1. Pure hypercholesterolemia  Stable on tricor and labs yearly   - fenofibrate (TRICOR) 145 MG tablet; TAKE 1 TABLET DAILY  Dispense: 90 tablet; Refill: 3    2. Gastroesophageal reflux disease without esophagitis  Stable on prevacid   - lansoprazole (PREVACID) 30 MG delayed release capsule; Take 1 capsule by mouth daily  Dispense: 90 capsule; Refill: 3        Edward received counseling on the following healthy behaviors: nutrition and exercise  Reviewed prior labs and health maintenance  Continue current medications, diet and exercise. Discussed use, benefit, and side effects of prescribed medications. Barriers to medication compliance addressed. Patient given educational materials - see patient instructions  Was a self-tracking handout given in paper form or via [employer]? Yes    Requested Prescriptions     Signed Prescriptions Disp Refills    lansoprazole (PREVACID) 30 MG delayed release capsule 90 capsule 3     Sig: Take 1 capsule by mouth daily    fenofibrate (TRICOR) 145 MG tablet 90 tablet 3     Sig: TAKE 1 TABLET DAILY       All patient questions answered. Patient voiced understanding. Quality Measures    Body mass index is 26.14 kg/m². Normal. Weight control planned discussed Healthy diet and regular exercise. BP: 130/86 Blood pressure is normal. Treatment plan consists of No treatment change needed.     Lab Results   Component Value Date    LDLCALC 107 03/22/2021 LDLCHOLESTEROL 107 08/24/2017    (goal LDL reduction with dx if diabetes is 50% LDL reduction)      PHQ Scores 7/13/2022 7/14/2021 2/6/2020 2/14/2019 3/1/2018 8/29/2016   PHQ2 Score 0 0 0 0 0 0   PHQ9 Score 0 0 0 0 0 0     Interpretation of Total Score Depression Severity: 1-4 = Minimal depression, 5-9 = Mild depression, 10-14 = Moderate depression, 15-19 = Moderately severe depression, 20-27 = Severe depression      Return in about 1 year (around 7/13/2023) for gerd, Hyperlipidemia.       Electronically signed by Maycol Toro MD on 7/13/2022 at 8:28 AM

## 2022-07-13 NOTE — PATIENT INSTRUCTIONS
Survey: You may be receiving a survey from [employer] regarding your visit today. You may get this in the mail, through your MyChart or in your email. Please complete the survey to enable us to provide the highest quality of care to you and your family. Please also, mention our names. If you cannot score us as very good (5 Stars) on any question, please feel free to call the office to discuss how we could have made your experience exceptional.      Thank You!         MD Darwin Cortez LPN

## 2022-08-09 ENCOUNTER — HOSPITAL ENCOUNTER (OUTPATIENT)
Age: 52
Discharge: HOME | End: 2022-08-09
Payer: COMMERCIAL

## 2022-08-09 PROCEDURE — G0103 PSA SCREENING: HCPCS

## 2022-08-09 PROCEDURE — 36415 COLL VENOUS BLD VENIPUNCTURE: CPT

## 2022-08-11 LAB — PROSTATE SPECIFIC ANTIGEN: 0.29 NG/ML

## 2022-12-02 ENCOUNTER — NURSE ONLY (OUTPATIENT)
Dept: FAMILY MEDICINE CLINIC | Age: 52
End: 2022-12-02

## 2022-12-02 ENCOUNTER — TELEPHONE (OUTPATIENT)
Dept: FAMILY MEDICINE CLINIC | Age: 52
End: 2022-12-02

## 2022-12-02 ENCOUNTER — HOSPITAL ENCOUNTER (OUTPATIENT)
Age: 52
Setting detail: SPECIMEN
Discharge: HOME | End: 2022-12-02
Payer: COMMERCIAL

## 2022-12-02 DIAGNOSIS — R35.0 URINE FREQUENCY: Primary | ICD-10-CM

## 2022-12-02 DIAGNOSIS — R31.9 HEMATURIA, UNSPECIFIED TYPE: ICD-10-CM

## 2022-12-02 DIAGNOSIS — R31.9 HEMATURIA, UNSPECIFIED TYPE: Primary | ICD-10-CM

## 2022-12-02 LAB
BILIRUBIN, POC: NEGATIVE
BLOOD URINE, POC: ABNORMAL
CLARITY, POC: ABNORMAL
COLOR, POC: ABNORMAL
GLUCOSE URINE, POC: NEGATIVE
KETONES, POC: NEGATIVE
LEUKOCYTE EST, POC: NEGATIVE
NITRITE, POC: NEGATIVE
PH, POC: 6
PROTEIN, POC: NEGATIVE
SPECIFIC GRAVITY, POC: 1.02
UROBILINOGEN, POC: 0.2

## 2022-12-02 PROCEDURE — 87086 URINE CULTURE/COLONY COUNT: CPT

## 2022-12-02 NOTE — TELEPHONE ENCOUNTER
----- Message from Umu Olivas MD sent at 12/2/2022  4:16 PM EST -----   the results just show blood but does not look like an infection.  ...  1.) you may send urine to culture just in case ---- please DO the order and bring to lab since I am not there  2.) the concern is for kidney stone or prostate issue so if continues need to push water but since this is late on Friday and does NOT look like an infection go to ER or make appt to see me Monday or Tuesday

## 2022-12-03 LAB
CULTURE: NO GROWTH
SPECIMEN DESCRIPTION: NORMAL

## 2022-12-05 ENCOUNTER — TELEPHONE (OUTPATIENT)
Dept: FAMILY MEDICINE CLINIC | Age: 52
End: 2022-12-05

## 2022-12-05 NOTE — TELEPHONE ENCOUNTER
Spoke with patient about urine culture results. Patient voiced understanding and stated he is experiencing burning with urination. Advised patient of possible referral to urology. Patient would like to speak with nurse/provider for next steps.

## 2022-12-05 NOTE — TELEPHONE ENCOUNTER
Spoke with pt he stated he has a urologist in [place] he will call in f/u with him. Pt asking if it is normal to have a burning feeling after or During a kidney stone.  Pt denies and chance of STD

## 2022-12-05 NOTE — TELEPHONE ENCOUNTER
[id number] Linda Alcazar for urology Referral for Dr Angela Singh --- could we call [facility] and get him in tomorrow or Wednesday there--- but if getting worse then ER for pain and scans and they would also contact Dr Angela Singh.

## 2022-12-23 ENCOUNTER — HOSPITAL ENCOUNTER (OUTPATIENT)
Dept: CT IMAGING | Age: 52
Discharge: HOME | End: 2022-12-23
Payer: COMMERCIAL

## 2022-12-23 DIAGNOSIS — N20.0 BILATERAL KIDNEY STONES: ICD-10-CM

## 2022-12-23 PROCEDURE — 74176 CT ABD & PELVIS W/O CONTRAST: CPT

## 2023-01-12 ENCOUNTER — OFFICE VISIT (OUTPATIENT)
Dept: PRIMARY CARE CLINIC | Age: 53
End: 2023-01-12
Payer: COMMERCIAL

## 2023-01-12 VITALS
WEIGHT: 177 LBS | RESPIRATION RATE: 18 BRPM | TEMPERATURE: 98 F | HEIGHT: 69 IN | HEART RATE: 76 BPM | OXYGEN SATURATION: 97 % | DIASTOLIC BLOOD PRESSURE: 102 MMHG | SYSTOLIC BLOOD PRESSURE: 161 MMHG | BODY MASS INDEX: 26.22 KG/M2

## 2023-01-12 DIAGNOSIS — H65.192 ACUTE MEE (MIDDLE EAR EFFUSION), LEFT: ICD-10-CM

## 2023-01-12 DIAGNOSIS — I10 PRIMARY HYPERTENSION: ICD-10-CM

## 2023-01-12 DIAGNOSIS — J06.9 VIRAL URI: Primary | ICD-10-CM

## 2023-01-12 DIAGNOSIS — R09.82 PND (POST-NASAL DRIP): ICD-10-CM

## 2023-01-12 PROCEDURE — 3077F SYST BP >= 140 MM HG: CPT | Performed by: NURSE PRACTITIONER

## 2023-01-12 PROCEDURE — G8427 DOCREV CUR MEDS BY ELIG CLIN: HCPCS | Performed by: NURSE PRACTITIONER

## 2023-01-12 PROCEDURE — 99213 OFFICE O/P EST LOW 20 MIN: CPT | Performed by: NURSE PRACTITIONER

## 2023-01-12 PROCEDURE — G8484 FLU IMMUNIZE NO ADMIN: HCPCS | Performed by: NURSE PRACTITIONER

## 2023-01-12 PROCEDURE — 1036F TOBACCO NON-USER: CPT | Performed by: NURSE PRACTITIONER

## 2023-01-12 PROCEDURE — 3017F COLORECTAL CA SCREEN DOC REV: CPT | Performed by: NURSE PRACTITIONER

## 2023-01-12 PROCEDURE — G8419 CALC BMI OUT NRM PARAM NOF/U: HCPCS | Performed by: NURSE PRACTITIONER

## 2023-01-12 PROCEDURE — 3080F DIAST BP >= 90 MM HG: CPT | Performed by: NURSE PRACTITIONER

## 2023-01-12 ASSESSMENT — ENCOUNTER SYMPTOMS
SORE THROAT: 0
VOMITING: 0
DIARRHEA: 0
COUGH: 0
SHORTNESS OF BREATH: 0
NAUSEA: 0
RHINORRHEA: 1

## 2023-01-12 NOTE — PROGRESS NOTES
HPI Notes    Name: Eladio Leonardo  : 1970         Chief Complaint:     Chief Complaint   Patient presents with    Otalgia     Started Monday-left ear pain, Started with pressure and now it is painful. History of Present Illness:        Otalgia   There is pain in the left ear. This is a new problem. The current episode started in the past 7 days. The problem occurs constantly. The problem has been gradually worsening. There has been no fever. The pain is moderate. Associated symptoms include hearing loss and rhinorrhea. Pertinent negatives include no coughing, diarrhea, ear discharge, headaches, sore throat or vomiting. He has tried acetaminophen and NSAIDs for the symptoms. The treatment provided mild relief. Pt has been taking sudafed for cold symptoms. Past Medical History:     Past Medical History:   Diagnosis Date    GERD (gastroesophageal reflux disease)     Hyperlipidemia     Kidney stones       Reviewed all health maintenance requirements and ordered appropriate tests  Health Maintenance Due   Topic Date Due    HIV screen  Never done    Hepatitis C screen  Never done    Shingles vaccine (1 of 2) Never done    COVID-19 Vaccine (4 - Booster for Pfizer series) 2022    Flu vaccine (1) 2022       Past Surgical History:     Past Surgical History:   Procedure Laterality Date    COLONOSCOPY N/A 2021    COLONOSCOPY performed by Lizett Morse MD at [address] [place] [address]      right knee    UPPER GASTROINTESTINAL ENDOSCOPY N/A 2021    EGD BIOPSY performed by Lizett Morse MD at [facility] ENDOSCOPY        Medications:       Prior to Admission medications    Medication Sig Start Date End Date Taking?  Authorizing Provider   lansoprazole (PREVACID) 30 MG delayed release capsule Take 1 capsule by mouth daily 22  Yes Concepcion Bryant MD   fenofibrate (TRICOR) 145 MG tablet TAKE 1 TABLET DAILY 22  Yes Concepcion Bryant MD   acetaminophen (TYLENOL) 500 MG tablet Take 1,000 mg by mouth every 6 hours as needed for Pain   Yes Historical Provider, MD   ibuprofen (ADVIL;MOTRIN) 200 MG tablet Take 800 mg by mouth every 6 hours as needed for Pain   Yes Historical Provider, MD        Allergies:       Patient has no known allergies. Social History:     Tobacco:    reports that he quit smoking about 15 years ago. His smoking use included cigarettes. He has a 10.00 pack-year smoking history. He has never used smokeless tobacco.  Alcohol:      reports that he does not currently use alcohol. Drug Use:  reports no history of drug use. Family History:     Family History   Problem Relation Age of Onset    Diabetes Mother     Cancer Mother         breast    High Blood Pressure Mother     Heart Failure Father     Cancer Father         colon    Heart Disease Father     Prostate Cancer Father     High Cholesterol Brother        Review of Systems:         Review of Systems   Constitutional:  Negative for chills and fever. HENT:  Positive for ear pain, hearing loss and rhinorrhea. Negative for ear discharge and sore throat. Respiratory:  Negative for cough and shortness of breath. Cardiovascular:  Negative for chest pain and palpitations. Gastrointestinal:  Negative for diarrhea, nausea and vomiting. Neurological:  Negative for dizziness, seizures and headaches. Physical Exam:     Vitals:  BP (!) 161/102 (Site: Left Upper Arm, Position: Sitting, Cuff Size: Medium Adult)   Pulse 76   Temp 98 °F (36.7 °C) (Oral)   Resp 18   Ht 5' 9\" (1.753 m)   Wt 177 lb (80.3 kg)   SpO2 97%   BMI 26.14 kg/m²       Physical Exam  Vitals and nursing note reviewed. Constitutional:       Appearance: He is well-developed. HENT:      Right Ear: Tympanic membrane normal.      Left Ear: A middle ear effusion (cloudy fluid behind TM) is present. Nose: Mucosal edema present. Mouth/Throat:      Pharynx: Posterior oropharyngeal erythema present.    Cardiovascular:      Rate and Rhythm: Normal rate and regular rhythm. Heart sounds: Normal heart sounds. Pulmonary:      Effort: Pulmonary effort is normal. No respiratory distress. Breath sounds: Normal breath sounds. Neurological:      Mental Status: He is alert. Psychiatric:         Behavior: Behavior is cooperative. Data:     Lab Results   Component Value Date/Time     09/03/2018 03:12 PM    K 3.9 09/03/2018 03:12 PM     09/03/2018 03:12 PM    CO2 24 09/03/2018 03:12 PM    BUN 15 09/03/2018 03:12 PM    CREATININE 0.84 09/03/2018 03:12 PM    GLUCOSE 99 03/23/2022 12:00 AM    PROT 7.6 08/24/2017 06:50 AM    LABALBU 4.5 08/24/2017 06:50 AM    LABALBU 4.7 02/14/2012 10:19 AM    BILITOT 0.35 08/24/2017 06:50 AM    ALKPHOS 52 08/24/2017 06:50 AM    AST 23 08/24/2017 06:50 AM    ALT 34 08/24/2017 06:50 AM     Lab Results   Component Value Date/Time    WBC 11.1 09/03/2018 03:12 PM    RBC 4.81 09/03/2018 03:12 PM    HGB 14.1 09/03/2018 03:12 PM    HCT 42.2 09/03/2018 03:12 PM    MCV 87.9 09/03/2018 03:12 PM    MCH 29.2 09/03/2018 03:12 PM    MCHC 33.3 09/03/2018 03:12 PM    RDW 14.1 09/03/2018 03:12 PM     09/03/2018 03:12 PM    MPV NOT REPORTED 09/03/2018 03:12 PM     Lab Results   Component Value Date/Time    TSH 4.06 08/27/2015 08:24 AM     Lab Results   Component Value Date/Time    CHOL 213 03/23/2022 12:00 AM    HDL 70 03/23/2022 12:00 AM    PSA 0.29 08/09/2022 08:31 AM          Assessment & Plan        Diagnosis Orders   1. Viral URI   --Coricidin HBP Cold and Cough 1 tab every 6 hours as needed (nasal decongestant and antihistamine)  Flonase 1 spray each nostril twice daily (nasal steroid)  Warm tea with 1tbsp honey (soothes the throat)  Increase water intake  Rest         2. PND (post-nasal drip)        3. Acute ZAKI (middle ear effusion), left        4. Primary hypertension   --may be secondary to sudafed use, but most likely underlying HTN. Pt will follow with PCP soon.           Patient verbalizes understanding and agreement with plan. All questions answered. If symptoms do not resolve or worsen, return to office. Completed Refills   Requested Prescriptions      No prescriptions requested or ordered in this encounter     No follow-ups on file. No orders of the defined types were placed in this encounter. No orders of the defined types were placed in this encounter. Patient Instructions     SURVEY:    You may be receiving a survey from [employer] regarding your visit today. Please complete the survey to enable us to provide the highest quality of care to you and your family. If you cannot score us a very good on any question, please call the office to discuss how we could of made your experience a very good one. Thank you for letting us take care of you today. We hope all your questions were addressed. If a question was overlooked or something else comes to mind after you return home, please contact a member of your Care Team listed below.     Thank you,  Marialuisa Caal MA      Your Care Team at [address] [facility]  Provider- STEFFANY Monroy  Provider- STEFFANY Irizarry  [address] [place] [address], [address] [place] [address]      Walk-in contact numbers:       Phone: [phone]                 Fax: [phone]    [facility] Hours:  Mon-Thurs: 9:00 am - 5:30 pm     Friday: 8:00 am - 12:00 pm           Sat-Sun: CLOSED         Electronically signed by TITA Santos CNP on 1/12/2023 at 3:34 PM           Completed Refills   Requested Prescriptions      No prescriptions requested or ordered in this encounter

## 2023-01-12 NOTE — LETTER
[facility]  Phone: [phone]  Fax: [phone]    TITA Sandoval CNP        January 12, 2023    [address] [address]      Dear Marquise Serra:    Coricidin HBP Cold and Cough 1 tab every 6 hours as needed (nasal decongestant and antihistamine)  Flonase 1 spray each nostril twice daily (nasal steroid)  Ibuprofen 3 times a day as needed (antiinflammatory)  Warm tea with 1tbsp honey (soothes the throat)  Increase water intake  Rest      If you have any questions or concerns, please don't hesitate to call.     Sincerely,          TITA Sandoval CNP

## 2023-01-12 NOTE — PATIENT INSTRUCTIONS
SURVEY:    You may be receiving a survey from [employer] regarding your visit today. Please complete the survey to enable us to provide the highest quality of care to you and your family. If you cannot score us a very good on any question, please call the office to discuss how we could of made your experience a very good one. Thank you for letting us take care of you today. We hope all your questions were addressed. If a question was overlooked or something else comes to mind after you return home, please contact a member of your Care Team listed below.     Thank you,  Christina Mcdaniel MA      Your Care Team at [address] [facility]  Provider- STEFFANY Couch  Provider- STEFFANY Garcia  [address] [address]  Reception- Mick Horvath      Walk-in contact numbers:       Phone: [phone]                 Fax: [phone]    [facility] Hours:  Mon-Thurs: 9:00 am - 5:30 pm     Friday: 8:00 am - 12:00 pm           Sat-Sun: CLOSED

## 2023-01-17 ENCOUNTER — OFFICE VISIT (OUTPATIENT)
Dept: FAMILY MEDICINE CLINIC | Age: 53
End: 2023-01-17
Payer: COMMERCIAL

## 2023-01-17 VITALS
SYSTOLIC BLOOD PRESSURE: 138 MMHG | DIASTOLIC BLOOD PRESSURE: 88 MMHG | HEART RATE: 86 BPM | TEMPERATURE: 98.6 F | OXYGEN SATURATION: 98 %

## 2023-01-17 DIAGNOSIS — R03.0 ELEVATED BP WITHOUT DIAGNOSIS OF HYPERTENSION: Primary | ICD-10-CM

## 2023-01-17 DIAGNOSIS — R07.89 OTHER CHEST PAIN: ICD-10-CM

## 2023-01-17 DIAGNOSIS — H69.82 EUSTACHIAN TUBE DYSFUNCTION, LEFT: ICD-10-CM

## 2023-01-17 PROCEDURE — 99214 OFFICE O/P EST MOD 30 MIN: CPT | Performed by: FAMILY MEDICINE

## 2023-01-17 PROCEDURE — 3017F COLORECTAL CA SCREEN DOC REV: CPT | Performed by: FAMILY MEDICINE

## 2023-01-17 PROCEDURE — G8419 CALC BMI OUT NRM PARAM NOF/U: HCPCS | Performed by: FAMILY MEDICINE

## 2023-01-17 PROCEDURE — G8484 FLU IMMUNIZE NO ADMIN: HCPCS | Performed by: FAMILY MEDICINE

## 2023-01-17 PROCEDURE — 1036F TOBACCO NON-USER: CPT | Performed by: FAMILY MEDICINE

## 2023-01-17 PROCEDURE — G8427 DOCREV CUR MEDS BY ELIG CLIN: HCPCS | Performed by: FAMILY MEDICINE

## 2023-01-17 SDOH — ECONOMIC STABILITY: FOOD INSECURITY: WITHIN THE PAST 12 MONTHS, YOU WORRIED THAT YOUR FOOD WOULD RUN OUT BEFORE YOU GOT MONEY TO BUY MORE.: NEVER TRUE

## 2023-01-17 SDOH — ECONOMIC STABILITY: FOOD INSECURITY: WITHIN THE PAST 12 MONTHS, THE FOOD YOU BOUGHT JUST DIDN'T LAST AND YOU DIDN'T HAVE MONEY TO GET MORE.: NEVER TRUE

## 2023-01-17 ASSESSMENT — PATIENT HEALTH QUESTIONNAIRE - PHQ9
1. LITTLE INTEREST OR PLEASURE IN DOING THINGS: 0
SUM OF ALL RESPONSES TO PHQ QUESTIONS 1-9: 0
SUM OF ALL RESPONSES TO PHQ9 QUESTIONS 1 & 2: 0
SUM OF ALL RESPONSES TO PHQ QUESTIONS 1-9: 0
2. FEELING DOWN, DEPRESSED OR HOPELESS: 0

## 2023-01-17 ASSESSMENT — ENCOUNTER SYMPTOMS
EYE DISCHARGE: 0
SORE THROAT: 0
SHORTNESS OF BREATH: 0
COUGH: 0
EYE REDNESS: 0
FACIAL SWELLING: 0
VOMITING: 0

## 2023-01-17 ASSESSMENT — SOCIAL DETERMINANTS OF HEALTH (SDOH): HOW HARD IS IT FOR YOU TO PAY FOR THE VERY BASICS LIKE FOOD, HOUSING, MEDICAL CARE, AND HEATING?: NOT HARD AT ALL

## 2023-01-17 NOTE — PROGRESS NOTES
HPI Notes    Name: Eli Kauffman  : 1970        Chief Complaint:     Chief Complaint   Patient presents with    Blood Pressure Check     Patient here today for BP check, BP was elevated at walk in on     Otalgia     Patient complains of left ear, still not able to hear out of it. Still feels congested. History of Present Illness:     Eli Kauffman is a 47 y.o.  male who presents with Blood Pressure Check (Patient here today for BP check, BP was elevated at walk in on ) and Otalgia (Patient complains of left ear, still not able to hear out of it. Still feels congested. )    Elevated BP - Pt started with a cold last week and took some Sudafed and then went to the Walk In for the cold symptoms. Pt had BP 160s/90s at Walk In. Pt stopped sudafed and no HA. No dizziness. Pt's cold is better but still has some ear pain on Lt (see below). Pt has not checked BP in past 5d since stopped sudafed. Otalgia   There is pain in the left ear. This is a new problem. The current episode started in the past 7 days (pt had a cold starting last week and although getting better, Pt still has some congestion in LT ear. ). The problem occurs constantly. There has been no fever. Associated symptoms include hearing loss. Pertinent negatives include no coughing, ear discharge, sore throat or vomiting. Treatments tried: flonase. Chest pain - pt complains of having some episodes at night where he wakes up with chest pain but relieve with glass of water. Pt not sure if reflux but had family hx of his father having an MI at age 40.     Past Medical History:     Past Medical History:   Diagnosis Date    GERD (gastroesophageal reflux disease)     Hyperlipidemia     Kidney stones       Reviewed all health maintenance requirements and ordered appropriate tests  Health Maintenance Due   Topic Date Due    HIV screen  Never done    Hepatitis C screen  Never done    Shingles vaccine (1 of 2) Never done    COVID-19 Vaccine (4 - Booster for Thayer Peter series) 01/14/2022    Flu vaccine (1) 08/01/2022       Past Surgical History:     Past Surgical History:   Procedure Laterality Date    COLONOSCOPY N/A 8/9/2021    COLONOSCOPY performed by Lakia Braun MD at [address] [place] [address]      right knee    UPPER GASTROINTESTINAL ENDOSCOPY N/A 8/9/2021    EGD BIOPSY performed by Lakia Braun MD at [facility] ENDOSCOPY        Medications:       Prior to Admission medications    Medication Sig Start Date End Date Taking? Authorizing Provider   lansoprazole (PREVACID) 30 MG delayed release capsule Take 1 capsule by mouth daily 7/13/22  Yes Heloise Peabody, MD   fenofibrate (TRICOR) 145 MG tablet TAKE 1 TABLET DAILY 7/13/22  Yes Heloise Peabody, MD   acetaminophen (TYLENOL) 500 MG tablet Take 1,000 mg by mouth every 6 hours as needed for Pain   Yes Historical Provider, MD   ibuprofen (ADVIL;MOTRIN) 200 MG tablet Take 800 mg by mouth every 6 hours as needed for Pain  Patient not taking: Reported on 1/17/2023    Historical Provider, MD        Allergies:       Patient has no known allergies. Social History:     Tobacco:    reports that he quit smoking about 15 years ago. His smoking use included cigarettes. He has a 10.00 pack-year smoking history. He has never used smokeless tobacco.  Alcohol:      reports that he does not currently use alcohol. Drug Use:  reports no history of drug use. Family History:     Family History   Problem Relation Age of Onset    Diabetes Mother     Cancer Mother         breast    High Blood Pressure Mother     Heart Failure Father     Cancer Father         colon    Heart Disease Father     Prostate Cancer Father     High Cholesterol Brother        Review of Systems:       Review of Systems   Constitutional:  Negative for chills and fever. HENT:  Positive for congestion, ear pain and hearing loss. Negative for ear discharge, facial swelling and sore throat. Eyes:  Negative for discharge and redness.   Respiratory:  Negative for cough and shortness of breath.    Cardiovascular:  Positive for chest pain. Negative for palpitations.   Gastrointestinal:  Negative for vomiting.   Neurological:  Negative for dizziness and facial asymmetry.       Physical Exam:     Physical Exam  Vitals reviewed.   Constitutional:       General: He is not in acute distress.     Appearance: Normal appearance. He is not ill-appearing.   HENT:      Right Ear: Tympanic membrane and ear canal normal.      Left Ear: Ear canal normal. Tympanic membrane is bulging. Tympanic membrane is not injected or scarred.      Ears:      Comments: LT TM is dull and bulging     Nose: No rhinorrhea.      Mouth/Throat:      Pharynx: No oropharyngeal exudate or posterior oropharyngeal erythema.   Eyes:      General:         Right eye: No discharge.         Left eye: No discharge.      Conjunctiva/sclera: Conjunctivae normal.   Cardiovascular:      Heart sounds: Normal heart sounds. No murmur heard.  Pulmonary:      Effort: Pulmonary effort is normal. No respiratory distress.      Breath sounds: Normal breath sounds. No wheezing.   Musculoskeletal:      Cervical back: Neck supple.   Neurological:      Mental Status: He is alert.       Vitals:  /88   Pulse 86   Temp 98.6 °F (37 °C) (Oral)   SpO2 98%       Data:     Lab Results   Component Value Date/Time     09/03/2018 03:12 PM    K 3.9 09/03/2018 03:12 PM     09/03/2018 03:12 PM    CO2 24 09/03/2018 03:12 PM    BUN 15 09/03/2018 03:12 PM    CREATININE 0.84 09/03/2018 03:12 PM    GLUCOSE 99 03/23/2022 12:00 AM    PROT 7.6 08/24/2017 06:50 AM    LABALBU 4.5 08/24/2017 06:50 AM    LABALBU 4.7 02/14/2012 10:19 AM    BILITOT 0.35 08/24/2017 06:50 AM    ALKPHOS 52 08/24/2017 06:50 AM    AST 23 08/24/2017 06:50 AM    ALT 34 08/24/2017 06:50 AM     Lab Results   Component Value Date/Time    WBC 11.1 09/03/2018 03:12 PM    RBC 4.81 09/03/2018 03:12 PM    HGB 14.1 09/03/2018 03:12 PM    HCT 42.2  09/03/2018 03:12 PM    MCV 87.9 09/03/2018 03:12 PM    MCH 29.2 09/03/2018 03:12 PM    MCHC 33.3 09/03/2018 03:12 PM    RDW 14.1 09/03/2018 03:12 PM     09/03/2018 03:12 PM    MPV NOT REPORTED 09/03/2018 03:12 PM     Lab Results   Component Value Date/Time    TSH 4.06 08/27/2015 08:24 AM     Lab Results   Component Value Date/Time    CHOL 213 03/23/2022 12:00 AM    HDL 70 03/23/2022 12:00 AM    PSA 0.29 08/09/2022 08:31 AM          Assessment/Plan:        1. Elevated BP without diagnosis of hypertension  BP is improved since stopping the sudafed so pt to have BP check periodically at school by nurse there and keep <140/<90. Pt to also keep low salt diet and coriciden meds if sick. 2. Eustachian tube dysfunction, left  Pt may stay on flonase and add either claritin or zyrtec once a day for few weeks     3. Other chest pain  Treadmill stress ordered with family hx and atypical chest pain  - CARDIAC STRESS TEST EXERCISE ONLY; Future        Return if symptoms worsen or fail to improve.       Electronically signed by Cristian Sandoval MD on 1/17/2023 at 8:13 PM

## 2023-01-17 NOTE — PATIENT INSTRUCTIONS
SURVEY:    You may be receiving a survey from [employer] regarding your visit today. Please complete the survey to enable us to provide the highest quality of care to you and your family. If you cannot score us a very good on any question, please call the office to discuss how we could have made your experience a very good one. Thank you.

## 2023-01-23 ENCOUNTER — HOSPITAL ENCOUNTER (OUTPATIENT)
Dept: NON INVASIVE DIAGNOSTICS | Age: 53
Discharge: HOME | End: 2023-01-23
Payer: COMMERCIAL

## 2023-01-23 DIAGNOSIS — R07.89 OTHER CHEST PAIN: ICD-10-CM

## 2023-01-23 DIAGNOSIS — R94.39 ABNORMAL STRESS ECG WITH TREADMILL: Primary | ICD-10-CM

## 2023-01-23 DIAGNOSIS — R07.9 CHEST PAIN, UNSPECIFIED TYPE: ICD-10-CM

## 2023-01-23 PROCEDURE — 93017 CV STRESS TEST TRACING ONLY: CPT

## 2023-01-24 NOTE — PROCEDURES
[place] [address]                              CARDIAC STRESS TEST    PATIENT NAME: Fabiano Beard                   :        1970  MED REC NO:   [id number]                              ROOM:  ACCOUNT NO:   [de-identified]                           ADMIT DATE: 2023  PROVIDER:     Felicia Wood MD      DATE OF STUDY:  2023    TREADMILL STRESS TEST    INDICATION:  Chest pain and severe family history of coronary artery  disease. He exercised 8 minutes on accelerated Ramón protocol achieving 13.3  METs. His peak heart rate was 139, which is 85% of maximum predicted  heart rate. He had no chest pain. However, he did have ST depression in leads II,  III and aVF and V6 suggesting anterolateral wall ischemia. These  resolved with rest, although he had mild nonspecific ST changes at rest.    This is overall an abnormal treadmill stress test with EKG changes  inferolaterally suggesting inferolateral wall ischemia. With his family history of coronary artery disease, I would recommend  doing a treadmill Myoview stress test for further workup.         Sindy Ormond, MD    D: 2023 5:28:13       T: 2023 5:30:34     [email or web address]  Job#: [id number]     Doc#: [id number]    CC:  Bradley Doherty

## 2023-01-26 ENCOUNTER — TELEPHONE (OUTPATIENT)
Dept: FAMILY MEDICINE CLINIC | Age: 53
End: 2023-01-26

## 2023-01-26 NOTE — TELEPHONE ENCOUNTER
About the only thing is if the using flonase once a day may increase to BID dosing but it does take awhile to get the fluid to reabsorb. Keep on the claritin. I doubt ENT would put a tube in anyways.

## 2023-01-26 NOTE — TELEPHONE ENCOUNTER
----- Message from Jake Kong MD sent at 1/24/2023  8:50 AM EST -----  Tell pt on his stress test few of hos EKG tracings were a little abnormal so Dr Gary Romero recommending treadmill stress test with myoview --- so he walks on treadmill but they give him medicine prior and then take pictures afterwards.  --- please pend

## 2023-01-26 NOTE — TELEPHONE ENCOUNTER
I spoke with Pt, Dr Nisa Colón office already has him scheduled for stress test.   Pt still c/o left ear feeling muffled, he is using Flonase and taking Claritin as directed. Pt asking if there is anything else he can do?

## 2023-02-14 ENCOUNTER — HOSPITAL ENCOUNTER (OUTPATIENT)
Dept: NUCLEAR MEDICINE | Age: 53
Discharge: HOME | End: 2023-02-16
Payer: COMMERCIAL

## 2023-02-14 ENCOUNTER — HOSPITAL ENCOUNTER (OUTPATIENT)
Dept: NON INVASIVE DIAGNOSTICS | Age: 53
Discharge: HOME | End: 2023-02-14
Payer: COMMERCIAL

## 2023-02-14 DIAGNOSIS — R07.9 CHEST PAIN, UNSPECIFIED TYPE: ICD-10-CM

## 2023-02-14 DIAGNOSIS — R94.39 ABNORMAL STRESS ECG WITH TREADMILL: ICD-10-CM

## 2023-02-14 PROCEDURE — 93017 CV STRESS TEST TRACING ONLY: CPT

## 2023-02-14 PROCEDURE — 3430000000 HC RX DIAGNOSTIC RADIOPHARMACEUTICAL: Performed by: INTERNAL MEDICINE

## 2023-02-14 PROCEDURE — A9500 TC99M SESTAMIBI: HCPCS | Performed by: INTERNAL MEDICINE

## 2023-02-14 PROCEDURE — 78452 HT MUSCLE IMAGE SPECT MULT: CPT

## 2023-02-14 RX ORDER — TECHNETIUM TC-99M SESTAMIBI 1 MG/10ML
30 INJECTION INTRAVENOUS
Status: COMPLETED | OUTPATIENT
Start: 2023-02-14 | End: 2023-02-14

## 2023-02-14 RX ORDER — TECHNETIUM TC-99M SESTAMIBI 1 MG/10ML
10 INJECTION INTRAVENOUS
Status: COMPLETED | OUTPATIENT
Start: 2023-02-14 | End: 2023-02-14

## 2023-02-14 RX ADMIN — TETRAKIS(2-METHOXYISOBUTYLISOCYANIDE)COPPER(I) TETRAFLUOROBORATE 10 MILLICURIE: 1 INJECTION, POWDER, LYOPHILIZED, FOR SOLUTION INTRAVENOUS at 09:43

## 2023-02-14 RX ADMIN — TETRAKIS(2-METHOXYISOBUTYLISOCYANIDE)COPPER(I) TETRAFLUOROBORATE 30 MILLICURIE: 1 INJECTION, POWDER, LYOPHILIZED, FOR SOLUTION INTRAVENOUS at 09:43

## 2023-02-15 NOTE — PROCEDURES
[place] [address]                              CARDIAC STRESS TEST    PATIENT NAME: Richelle Seo                   :        1970  MED REC NO:   [id number]                              ROOM:  ACCOUNT NO:   [de-identified]                           ADMIT DATE: 2023  PROVIDER:     Leslie Miller MD      DATE OF STUDY:  2023    TREADMILL MYOVIEW STRESS TEST    INDICATION:  1. Chest pain. 2.  Abnormal treadmill stress test.    He exercised 9 minutes and 40 seconds on accelerated Ramón protocol. Peak heart rate was _____, which is 80% of maximum predicted heart rate. He had no chest pain. He had mild ST depression inferolaterally. This  is an overall abnormal stress test with mild ST depression inferiorly  consistent with possible inferior wall ischemia.         Angel Guerin MD    D: 02/15/2023 5:08:45       T: 02/15/2023 5:11:12     [email or web address]  Job#: [id number]     Doc#: [id number]    CC:  Joel Robles

## 2023-02-15 NOTE — PROCEDURES
[place] [address]                              CARDIAC STRESS TEST    PATIENT NAME: Shari Goodrich                   :        1970  MED REC NO:   [id number]                              ROOM:  ACCOUNT NO:   [de-identified]                           ADMIT DATE: 2023  PROVIDER:     Netta Leyden      DATE OF STUDY:  2023    Cardiovascular Diagnostics Department    Ordering Provider:  Rome Bynum MD    Primary Care Provider:  Landon Galarza MD    Interpreting Physician:  Netta Leyden, MD    MYOCARDIAL PERFUSION STRESS IMAGING    The stress ECG results are reported separately. NUCLEAR IMAGING RESULTS:  The overall quality of the study is good. Mild attenuation artifact was seen. There is no evidence of abnormal  lung uptake. Additionally, the right ventricle appears normal.  The  left ventricular cavity is noted to be normal in size on stress images. There is no evidence of transient ischemic dilatation (TID) of the left  ventricle. Gated SPECT imaging reveals normal myocardial thickening and wall motion  with a calculated left ventricular ejection fraction (EF) of 67%. The rest images demonstrate homogenous tracer distribution throughout  the myocardium. On stress imaging, a small perfusion abnormality of mild intensity was  noted in the anterior region(s) which is most likely due to artifact. IMPRESSION:  1. Most likely normal myocardial perfusion imaging with soft tissue  artifact but without evidence of significant myocardial ischemia or  infarction. 2.  Global left ventricular systolic function was normal without  regional wall motion abnormalities. Overall these results are most consistent with a low risk for  significant coronary artery disease.     Although the patient's results were not completely normal, unless  clinical suspicion for significant ongoing coronary artery ischemia is  high, I would not suggest pursuing additional testing by coronary  angiography.          Patricia Tom    D: 02/15/2023 8:46:27       T: 02/15/2023 8:51:34     [email or web address]  Job#: [id number]     Doc#: Unknown    CC:  MD Alfredo Marcus

## 2023-02-16 ENCOUNTER — TELEPHONE (OUTPATIENT)
Dept: FAMILY MEDICINE CLINIC | Age: 53
End: 2023-02-16

## 2023-02-16 DIAGNOSIS — R07.89 OTHER CHEST PAIN: Primary | ICD-10-CM

## 2023-02-16 DIAGNOSIS — R94.39 ABNORMAL STRESS TEST: ICD-10-CM

## 2023-02-16 NOTE — TELEPHONE ENCOUNTER
----- Message from Zarina Fields MD sent at 2/16/2023 11:10 AM EST -----  Ok so tell pt I apologize but yesterday the myoview portion of stress test said low risk but today the exercise portion came back (usually it comes first). So in looking at everything, I do want him to see Dr Julia Patterson also knows his parents and can just review test and give us his opinion officially.

## 2023-02-20 DIAGNOSIS — K21.9 GASTROESOPHAGEAL REFLUX DISEASE, UNSPECIFIED WHETHER ESOPHAGITIS PRESENT: ICD-10-CM

## 2023-02-20 DIAGNOSIS — E78.1 PURE HYPERGLYCERIDEMIA: ICD-10-CM

## 2023-02-20 DIAGNOSIS — Z13.29 SCREENING FOR THYROID DISORDER: ICD-10-CM

## 2023-02-20 DIAGNOSIS — R07.89 ATYPICAL CHEST PAIN: ICD-10-CM

## 2023-02-20 DIAGNOSIS — R94.39 ABNORMAL STRESS TEST: Primary | ICD-10-CM

## 2023-02-20 DIAGNOSIS — E78.00 PURE HYPERCHOLESTEROLEMIA: ICD-10-CM

## 2023-02-23 ENCOUNTER — OFFICE VISIT (OUTPATIENT)
Dept: FAMILY MEDICINE CLINIC | Age: 53
End: 2023-02-23
Payer: COMMERCIAL

## 2023-02-23 VITALS
SYSTOLIC BLOOD PRESSURE: 140 MMHG | OXYGEN SATURATION: 98 % | BODY MASS INDEX: 26.58 KG/M2 | WEIGHT: 180 LBS | HEART RATE: 90 BPM | DIASTOLIC BLOOD PRESSURE: 90 MMHG

## 2023-02-23 DIAGNOSIS — I10 PRIMARY HYPERTENSION: Primary | ICD-10-CM

## 2023-02-23 PROCEDURE — 99213 OFFICE O/P EST LOW 20 MIN: CPT | Performed by: FAMILY MEDICINE

## 2023-02-23 PROCEDURE — 3075F SYST BP GE 130 - 139MM HG: CPT | Performed by: FAMILY MEDICINE

## 2023-02-23 PROCEDURE — G8419 CALC BMI OUT NRM PARAM NOF/U: HCPCS | Performed by: FAMILY MEDICINE

## 2023-02-23 PROCEDURE — G8484 FLU IMMUNIZE NO ADMIN: HCPCS | Performed by: FAMILY MEDICINE

## 2023-02-23 PROCEDURE — 3080F DIAST BP >= 90 MM HG: CPT | Performed by: FAMILY MEDICINE

## 2023-02-23 PROCEDURE — 1036F TOBACCO NON-USER: CPT | Performed by: FAMILY MEDICINE

## 2023-02-23 PROCEDURE — 3017F COLORECTAL CA SCREEN DOC REV: CPT | Performed by: FAMILY MEDICINE

## 2023-02-23 PROCEDURE — G8427 DOCREV CUR MEDS BY ELIG CLIN: HCPCS | Performed by: FAMILY MEDICINE

## 2023-02-23 RX ORDER — AMLODIPINE BESYLATE 5 MG/1
5 TABLET ORAL DAILY
Qty: 30 TABLET | Refills: 4 | Status: SHIPPED | OUTPATIENT
Start: 2023-02-23

## 2023-02-23 SDOH — ECONOMIC STABILITY: FOOD INSECURITY: WITHIN THE PAST 12 MONTHS, YOU WORRIED THAT YOUR FOOD WOULD RUN OUT BEFORE YOU GOT MONEY TO BUY MORE.: NEVER TRUE

## 2023-02-23 SDOH — ECONOMIC STABILITY: FOOD INSECURITY: WITHIN THE PAST 12 MONTHS, THE FOOD YOU BOUGHT JUST DIDN'T LAST AND YOU DIDN'T HAVE MONEY TO GET MORE.: NEVER TRUE

## 2023-02-23 SDOH — ECONOMIC STABILITY: HOUSING INSECURITY
IN THE LAST 12 MONTHS, WAS THERE A TIME WHEN YOU DID NOT HAVE A STEADY PLACE TO SLEEP OR SLEPT IN A SHELTER (INCLUDING NOW)?: NO

## 2023-02-23 SDOH — ECONOMIC STABILITY: INCOME INSECURITY: HOW HARD IS IT FOR YOU TO PAY FOR THE VERY BASICS LIKE FOOD, HOUSING, MEDICAL CARE, AND HEATING?: NOT HARD AT ALL

## 2023-02-23 ASSESSMENT — ENCOUNTER SYMPTOMS
EYE REDNESS: 0
SHORTNESS OF BREATH: 0
BLURRED VISION: 0
EYE DISCHARGE: 0

## 2023-02-23 NOTE — PROGRESS NOTES
HPI Notes    Name: Bruce Frazier  : 1970        Chief Complaint:     Chief Complaint   Patient presents with    Hypertension     Pt BP has been running over the past month. History of Present Illness:     Bruce Frazier is a 47 y.o.  male who presents with Hypertension (Pt BP has been running over the past month.)      Hypertension  This is a new problem. The current episode started more than 1 year ago. The problem is unchanged. The problem is uncontrolled (BP has been running 140-150/ 90s even at home. Pt has never take a BP medication). Pertinent negatives include no blurred vision, chest pain, headaches, palpitations, peripheral edema or shortness of breath. There are no associated agents to hypertension. Risk factors for coronary artery disease include male gender. Past Medical History:     Past Medical History:   Diagnosis Date    GERD (gastroesophageal reflux disease)     Hyperlipidemia     Kidney stones       Reviewed all health maintenance requirements and ordered appropriate tests  Health Maintenance Due   Topic Date Due    HIV screen  Never done    Hepatitis C screen  Never done    Shingles vaccine (1 of 2) Never done    COVID-19 Vaccine (4 - Booster for Pfizer series) 2022    Flu vaccine (1) 2022       Past Surgical History:     Past Surgical History:   Procedure Laterality Date    COLONOSCOPY N/A 2021    COLONOSCOPY performed by Andrey Kiran MD at [address] [place] [address]      right knee    UPPER GASTROINTESTINAL ENDOSCOPY N/A 2021    EGD BIOPSY performed by Andrey Kiran MD at [facility] ENDOSCOPY        Medications:       Prior to Admission medications    Medication Sig Start Date End Date Taking?  Authorizing Provider   amLODIPine (NORVASC) 5 MG tablet Take 1 tablet by mouth daily 23  Yes Amaury Haney MD   lansoprazole (PREVACID) 30 MG delayed release capsule Take 1 capsule by mouth daily 22  Yes Amaury Haney MD fenofibrate (TRICOR) 145 MG tablet TAKE 1 TABLET DAILY 7/13/22  Yes Trip Davis MD   acetaminophen (TYLENOL) 500 MG tablet Take 1,000 mg by mouth every 6 hours as needed for Pain   Yes Historical Provider, MD   ibuprofen (ADVIL;MOTRIN) 200 MG tablet Take 800 mg by mouth every 6 hours as needed for Pain  Patient not taking: Reported on 1/17/2023    Historical Provider, MD        Allergies:       Patient has no known allergies. Social History:     Tobacco:    reports that he quit smoking about 16 years ago. His smoking use included cigarettes. He has a 10.00 pack-year smoking history. He has never used smokeless tobacco.  Alcohol:      reports that he does not currently use alcohol. Drug Use:  reports no history of drug use. Family History:     Family History   Problem Relation Age of Onset    Diabetes Mother     Cancer Mother         breast    High Blood Pressure Mother     Heart Failure Father     Cancer Father         colon    Heart Disease Father     Prostate Cancer Father     High Cholesterol Brother        Review of Systems:       Review of Systems   Constitutional:  Negative for chills and fever. Eyes:  Negative for blurred vision, discharge and redness. Respiratory:  Negative for shortness of breath. Cardiovascular:  Negative for chest pain, palpitations and leg swelling. Neurological:  Negative for dizziness, facial asymmetry, light-headedness and headaches. Physical Exam:     Physical Exam  Vitals reviewed. Constitutional:       General: He is not in acute distress. Appearance: Normal appearance. He is not ill-appearing. HENT:      Head: Normocephalic and atraumatic. Eyes:      Conjunctiva/sclera: Conjunctivae normal.   Cardiovascular:      Rate and Rhythm: Normal rate and regular rhythm. Heart sounds: Normal heart sounds. No murmur heard. Pulmonary:      Effort: Pulmonary effort is normal. No respiratory distress. Breath sounds: Normal breath sounds. Musculoskeletal:      Cervical back: Neck supple. Neurological:      Mental Status: He is alert. Vitals:  BP (!) 140/90 (Site: Right Upper Arm, Cuff Size: Medium Adult)   Pulse 90   Wt 180 lb (81.6 kg)   SpO2 98%   BMI 26.58 kg/m²       Data:     Lab Results   Component Value Date/Time     09/03/2018 03:12 PM    K 3.9 09/03/2018 03:12 PM     09/03/2018 03:12 PM    CO2 24 09/03/2018 03:12 PM    BUN 15 09/03/2018 03:12 PM    CREATININE 0.84 09/03/2018 03:12 PM    GLUCOSE 99 03/23/2022 12:00 AM    PROT 7.6 08/24/2017 06:50 AM    LABALBU 4.5 08/24/2017 06:50 AM    LABALBU 4.7 02/14/2012 10:19 AM    BILITOT 0.35 08/24/2017 06:50 AM    ALKPHOS 52 08/24/2017 06:50 AM    AST 23 08/24/2017 06:50 AM    ALT 34 08/24/2017 06:50 AM     Lab Results   Component Value Date/Time    WBC 11.1 09/03/2018 03:12 PM    RBC 4.81 09/03/2018 03:12 PM    HGB 14.1 09/03/2018 03:12 PM    HCT 42.2 09/03/2018 03:12 PM    MCV 87.9 09/03/2018 03:12 PM    MCH 29.2 09/03/2018 03:12 PM    MCHC 33.3 09/03/2018 03:12 PM    RDW 14.1 09/03/2018 03:12 PM     09/03/2018 03:12 PM    MPV NOT REPORTED 09/03/2018 03:12 PM     Lab Results   Component Value Date/Time    TSH 4.06 08/27/2015 08:24 AM     Lab Results   Component Value Date/Time    CHOL 213 03/23/2022 12:00 AM    HDL 70 03/23/2022 12:00 AM    PSA 0.29 08/09/2022 08:31 AM          Assessment/Plan:        1. Primary hypertension  Pt to start norvasc 5mg and d/w pt side effects of leg swelling. Pt to take home BP and bring in readings in 2-3wks. [facility] received counseling on the following healthy behaviors: nutrition and exercise  Reviewed prior labs and health maintenance  Continue current medications, diet and exercise. Discussed use, benefit, and side effects of prescribed medications. Barriers to medication compliance addressed. Patient given educational materials - see patient instructions  Was a self-tracking handout given in paper form or via [employer]? Yes    Requested Prescriptions     Signed Prescriptions Disp Refills    amLODIPine (NORVASC) 5 MG tablet 30 tablet 4     Sig: Take 1 tablet by mouth daily       All patient questions answered. Patient voiced understanding. Quality Measures    Body mass index is 26.58 kg/m². Normal. Weight control planned discussed Healthy diet and regular exercise. BP: (!) 140/90 Blood pressure is high. Treatment plan consists of Antihypertensive Medication Started. Lab Results   Component Value Date    [facility] 115 03/23/2022    LDLCHOLESTEROL 107 08/24/2017    (goal LDL reduction with dx if diabetes is 50% LDL reduction)      PHQ Scores 1/17/2023 7/13/2022 7/14/2021 2/6/2020 2/14/2019 3/1/2018 8/29/2016   PHQ2 Score 0 0 0 0 0 0 0   PHQ9 Score 0 0 0 0 0 0 0     Interpretation of Total Score Depression Severity: 1-4 = Minimal depression, 5-9 = Mild depression, 10-14 = Moderate depression, 15-19 = Moderately severe depression, 20-27 = Severe depression      Return in about 5 months (around 7/23/2023) for HTN, Hyperlipidemia, gerd.       Electronically signed by Deion Marin MD on 2/23/2023 at 6:12 PM

## 2023-03-11 ENCOUNTER — HOSPITAL ENCOUNTER (OUTPATIENT)
Age: 53
End: 2023-03-11
Payer: COMMERCIAL

## 2023-03-11 ENCOUNTER — HOSPITAL ENCOUNTER (OUTPATIENT)
Age: 53
Discharge: HOME | End: 2023-03-11
Payer: COMMERCIAL

## 2023-03-11 ENCOUNTER — HOSPITAL ENCOUNTER (OUTPATIENT)
Dept: GENERAL RADIOLOGY | Age: 53
End: 2023-03-11
Payer: COMMERCIAL

## 2023-03-11 DIAGNOSIS — E78.00 PURE HYPERCHOLESTEROLEMIA: ICD-10-CM

## 2023-03-11 DIAGNOSIS — R07.89 ATYPICAL CHEST PAIN: ICD-10-CM

## 2023-03-11 DIAGNOSIS — R94.39 ABNORMAL STRESS TEST: ICD-10-CM

## 2023-03-11 DIAGNOSIS — Z13.29 SCREENING FOR THYROID DISORDER: ICD-10-CM

## 2023-03-11 DIAGNOSIS — K21.9 GASTROESOPHAGEAL REFLUX DISEASE, UNSPECIFIED WHETHER ESOPHAGITIS PRESENT: ICD-10-CM

## 2023-03-11 DIAGNOSIS — E78.1 PURE HYPERGLYCERIDEMIA: ICD-10-CM

## 2023-03-11 LAB
ABSOLUTE EOS #: 0.2 K/UL (ref 0–0.4)
ABSOLUTE LYMPH #: 2.5 K/UL (ref 1–4.8)
ABSOLUTE MONO #: 0.5 K/UL (ref 0–1)
ALBUMIN SERPL-MCNC: 4.7 G/DL (ref 3.5–5.2)
ALP SERPL-CCNC: 66 U/L (ref 40–129)
ALT SERPL-CCNC: 37 U/L (ref 5–41)
ANION GAP SERPL CALCULATED.3IONS-SCNC: 9 MMOL/L (ref 9–17)
AST SERPL-CCNC: 29 U/L
BASOPHILS # BLD: 1 % (ref 0–2)
BASOPHILS ABSOLUTE: 0.1 K/UL (ref 0–0.2)
BILIRUB SERPL-MCNC: 0.3 MG/DL (ref 0.3–1.2)
BUN SERPL-MCNC: 25 MG/DL (ref 6–20)
BUN/CREAT BLD: 29 (ref 9–20)
CALCIUM SERPL-MCNC: 9.1 MG/DL (ref 8.6–10.4)
CHLORIDE SERPL-SCNC: 112 MMOL/L (ref 98–107)
CHOLEST SERPL-MCNC: 207 MG/DL
CHOLESTEROL/HDL RATIO: 3.3
CO2 SERPL-SCNC: 24 MMOL/L (ref 20–31)
CREAT SERPL-MCNC: 0.87 MG/DL (ref 0.7–1.2)
DIFFERENTIAL TYPE: YES
EKG ATRIAL RATE: 70 BPM
EKG P AXIS: 71 DEGREES
EKG P-R INTERVAL: 180 MS
EKG Q-T INTERVAL: 412 MS
EKG QRS DURATION: 104 MS
EKG QTC CALCULATION (BAZETT): 444 MS
EKG R AXIS: 77 DEGREES
EKG T AXIS: 57 DEGREES
EKG VENTRICULAR RATE: 70 BPM
EOSINOPHILS RELATIVE PERCENT: 4 % (ref 0–5)
GFR SERPL CREATININE-BSD FRML MDRD: >60 ML/MIN/1.73M2
GLUCOSE SERPL-MCNC: 102 MG/DL (ref 70–99)
HCT VFR BLD AUTO: 44.4 % (ref 41–53)
HDLC SERPL-MCNC: 63 MG/DL
HGB BLD-MCNC: 14.7 G/DL (ref 13.5–17.5)
LDLC SERPL CALC-MCNC: 123 MG/DL (ref 0–130)
LYMPHOCYTES # BLD: 39 % (ref 13–44)
MAGNESIUM SERPL-MCNC: 2.4 MG/DL (ref 1.6–2.6)
MCH RBC QN AUTO: 28.9 PG (ref 26–34)
MCHC RBC AUTO-ENTMCNC: 33 G/DL (ref 31–37)
MCV RBC AUTO: 87.5 FL (ref 80–100)
MONOCYTES # BLD: 7 % (ref 5–9)
PATIENT FASTING?: YES
PDW BLD-RTO: 14.2 % (ref 12.1–15.2)
PLATELET # BLD AUTO: 355 K/UL (ref 140–450)
POTASSIUM SERPL-SCNC: 4.3 MMOL/L (ref 3.7–5.3)
PROT SERPL-MCNC: 7.7 G/DL (ref 6.4–8.3)
RBC # BLD: 5.07 M/UL (ref 4.5–5.9)
SEG NEUTROPHILS: 49 % (ref 39–75)
SEGMENTED NEUTROPHILS ABSOLUTE COUNT: 3.1 K/UL (ref 2.1–6.5)
SODIUM SERPL-SCNC: 145 MMOL/L (ref 135–144)
TRIGL SERPL-MCNC: 106 MG/DL
TSH SERPL-ACNC: 2.52 UIU/ML (ref 0.3–5)
WBC # BLD AUTO: 6.3 K/UL (ref 3.5–11)

## 2023-03-11 PROCEDURE — 71046 X-RAY EXAM CHEST 2 VIEWS: CPT

## 2023-03-11 PROCEDURE — 80053 COMPREHEN METABOLIC PANEL: CPT

## 2023-03-11 PROCEDURE — 83735 ASSAY OF MAGNESIUM: CPT

## 2023-03-11 PROCEDURE — 85025 COMPLETE CBC W/AUTO DIFF WBC: CPT

## 2023-03-11 PROCEDURE — 36415 COLL VENOUS BLD VENIPUNCTURE: CPT

## 2023-03-11 PROCEDURE — 93005 ELECTROCARDIOGRAM TRACING: CPT

## 2023-03-11 PROCEDURE — 80061 LIPID PANEL: CPT

## 2023-03-11 PROCEDURE — 84443 ASSAY THYROID STIM HORMONE: CPT

## 2023-03-28 ENCOUNTER — OFFICE VISIT (OUTPATIENT)
Dept: CARDIOLOGY CLINIC | Age: 53
End: 2023-03-28
Payer: COMMERCIAL

## 2023-03-28 VITALS
SYSTOLIC BLOOD PRESSURE: 120 MMHG | OXYGEN SATURATION: 96 % | BODY MASS INDEX: 26.73 KG/M2 | HEART RATE: 84 BPM | WEIGHT: 181 LBS | DIASTOLIC BLOOD PRESSURE: 70 MMHG

## 2023-03-28 DIAGNOSIS — E78.00 PURE HYPERCHOLESTEROLEMIA: Primary | ICD-10-CM

## 2023-03-28 PROCEDURE — 1036F TOBACCO NON-USER: CPT | Performed by: INTERNAL MEDICINE

## 2023-03-28 PROCEDURE — G8427 DOCREV CUR MEDS BY ELIG CLIN: HCPCS | Performed by: INTERNAL MEDICINE

## 2023-03-28 PROCEDURE — 99204 OFFICE O/P NEW MOD 45 MIN: CPT | Performed by: INTERNAL MEDICINE

## 2023-03-28 PROCEDURE — 3017F COLORECTAL CA SCREEN DOC REV: CPT | Performed by: INTERNAL MEDICINE

## 2023-03-28 PROCEDURE — G8484 FLU IMMUNIZE NO ADMIN: HCPCS | Performed by: INTERNAL MEDICINE

## 2023-03-28 PROCEDURE — G8419 CALC BMI OUT NRM PARAM NOF/U: HCPCS | Performed by: INTERNAL MEDICINE

## 2023-03-28 RX ORDER — ROSUVASTATIN CALCIUM 20 MG/1
20 TABLET, COATED ORAL DAILY
Qty: 30 TABLET | Refills: 11 | Status: SHIPPED | OUTPATIENT
Start: 2023-03-28

## 2023-03-28 NOTE — LETTER
time.    His blood pressure is under good control. His lipids are also under fairly good control with the LDL of 123. However, I would like to see how he does with Crestor alone at 20 mg daily. Since he does have a strong family history of coronary artery disease and since he is very health conscious, it would be nice to see his LDL below 100 if possible. With this in mind, I did stop his TriCor and he will start Crestor 20 mg daily and we will do a lipid profile and complete metabolic profile to check his liver function tests in three months. If his triglycerides become very elevated over 200 to 300, then we could always add TriCor back on. However, I think Crestor might control the triglycerides enough with lowering the LDL to a more ideal level. If he develops myalgias, etc., we can always change. I truly enjoyed seeing Mr. Mcmahan. I have not scheduled a return appointment although would do a lipid profile in three months along with liver function tests. He does know to contact either you or myself if he develops loss of energy and shortness of breath with exertion or exertional chest heaviness or tightness. Thank you very much for allowing me the privilege of seeing Mr. Mcmahan. If you have any questions on my thoughts, please do not hesitate to contact me.     Sincerely,        Merrill Moulton MD    D: 03/28/2023 9:01:39     T: 03/28/2023 9:06:07     [id number]  Job#: [id number]   Doc#: [id number]

## 2023-04-03 NOTE — PROGRESS NOTES
Ov DR Toussaint Links  consult   Review stress test   Started on amlodipine   Bp at home 130-140/85  No chest pain or sob  Did have chest pain when   Seeing DR Julianna Espino   None since   Has acid reflux wasn't  Sure if that was the cause. Has pain at night gets a drink  Of water relieved pain. Family history : father had CABG and ICD  First MI age 40. Pt  3 of own wife has 3   Son age 15 ,son 27 supervisor   At [employer]. , daughter 25  Hiring on line nurses. Pt is a  at [employer] echo done. To stop tricor and start crestor 20 mg daily  Will repeat lipids and cmp in 3 mths and call    See prn.
abnormalities and normal chamber sizes. IMPRESSION:  1. Chest pain, atypical, occurring at night, most likely GERD, relieved with drinking water and sitting up. 2.  Abnormal treadmill stress test by EKG criteria with ST depression in leads II, III and aVF and V6.  3.  Normal Myoview perfusion scan again with no chest pain during the treadmill stress test, but again EKG changes inferolaterally but normal perfusion with low risk of coronary artery disease. 4.  Strong family history of coronary artery disease, with father having an MI at 40 with bypass surgery and ICD eventually being placed. 5.  Functional class I with no chest pain or chest discomfort with activity and no shortness of breath and no unusual loss of energy. 6.  Hypertension, well controlled. 7.  Long history of hyperlipidemia, been treated since 2002, currently on TriCor 145 mg daily with an . PLAN:  1. I will stop TriCor and start Crestor 20 mg daily. 2.  We will repeat lipids and complete metabolic profile in three months and call. 3.  If his triglycerides are elevated, greater than 200 or 300, we can always place back on TriCor. DISCUSSION:  Mr. Viviana Ingram really has very atypical chest pain. It occurs mainly at night, relieved with sitting up and drinking water. He has no unusual exertional chest pain, shortness of breath or loss of energy. His stress test was unusual in that he had ST depression in leads II, III, aVF and V6 consistent with inferior wall ischemia, although he had no chest pain during the test.    When we did the perfusion scan along with his treadmill, he again had EKG changes but remained symptom free and his perfusion scan appeared to be normal.    Therefore, I do not think there is any indication to do any invasive testing.   I did talk to him specifically about loss of energy, shortness of breath with activity or exertional chest pain relieved with rest.  He has none of these symptoms at this

## 2023-07-12 ENCOUNTER — OFFICE VISIT (OUTPATIENT)
Dept: FAMILY MEDICINE CLINIC | Age: 53
End: 2023-07-12
Payer: COMMERCIAL

## 2023-07-12 VITALS
DIASTOLIC BLOOD PRESSURE: 86 MMHG | WEIGHT: 176 LBS | OXYGEN SATURATION: 98 % | BODY MASS INDEX: 26.07 KG/M2 | HEIGHT: 69 IN | SYSTOLIC BLOOD PRESSURE: 122 MMHG | HEART RATE: 72 BPM

## 2023-07-12 DIAGNOSIS — R73.09 ELEVATED GLUCOSE: ICD-10-CM

## 2023-07-12 DIAGNOSIS — E78.00 PURE HYPERCHOLESTEROLEMIA: ICD-10-CM

## 2023-07-12 DIAGNOSIS — K21.9 GASTROESOPHAGEAL REFLUX DISEASE WITHOUT ESOPHAGITIS: ICD-10-CM

## 2023-07-12 DIAGNOSIS — I10 PRIMARY HYPERTENSION: Primary | ICD-10-CM

## 2023-07-12 LAB — HBA1C MFR BLD: 5.2 %

## 2023-07-12 PROCEDURE — 99214 OFFICE O/P EST MOD 30 MIN: CPT | Performed by: FAMILY MEDICINE

## 2023-07-12 PROCEDURE — 3017F COLORECTAL CA SCREEN DOC REV: CPT | Performed by: FAMILY MEDICINE

## 2023-07-12 PROCEDURE — G8419 CALC BMI OUT NRM PARAM NOF/U: HCPCS | Performed by: FAMILY MEDICINE

## 2023-07-12 PROCEDURE — 3074F SYST BP LT 130 MM HG: CPT | Performed by: FAMILY MEDICINE

## 2023-07-12 PROCEDURE — G8427 DOCREV CUR MEDS BY ELIG CLIN: HCPCS | Performed by: FAMILY MEDICINE

## 2023-07-12 PROCEDURE — 3079F DIAST BP 80-89 MM HG: CPT | Performed by: FAMILY MEDICINE

## 2023-07-12 PROCEDURE — 1036F TOBACCO NON-USER: CPT | Performed by: FAMILY MEDICINE

## 2023-07-12 PROCEDURE — 83037 HB GLYCOSYLATED A1C HOME DEV: CPT | Performed by: FAMILY MEDICINE

## 2023-07-12 RX ORDER — LANSOPRAZOLE 30 MG/1
30 CAPSULE, DELAYED RELEASE ORAL DAILY
Qty: 90 CAPSULE | Refills: 3 | Status: SHIPPED | OUTPATIENT
Start: 2023-07-12

## 2023-07-12 RX ORDER — AMLODIPINE BESYLATE 5 MG/1
5 TABLET ORAL DAILY
Qty: 30 TABLET | Refills: 5 | Status: SHIPPED | OUTPATIENT
Start: 2023-07-12

## 2023-07-12 ASSESSMENT — ENCOUNTER SYMPTOMS
TROUBLE SWALLOWING: 0
VOMITING: 0
ABDOMINAL PAIN: 0
SHORTNESS OF BREATH: 0
COUGH: 0
NAUSEA: 0
EYE DISCHARGE: 0
DIARRHEA: 0
BLOOD IN STOOL: 0
EYE REDNESS: 0
HEARTBURN: 0

## 2023-07-12 NOTE — PROGRESS NOTES
HPI Notes    Name: Jeanette Brooke  : 1970        Chief Complaint:     Chief Complaint   Patient presents with    Hypertension     Check up. Discuss labs. Med refills. Hyperlipidemia    Gastroesophageal Reflux       History of Present Illness:     Jeanette Brooke is a 47 y.o.  male who presents with Hypertension (Check up. Discuss labs. Med refills. ), Hyperlipidemia, and Gastroesophageal Reflux      Hypertension  This is a chronic problem. The current episode started more than 1 year ago. The problem is unchanged. The problem is controlled. Pertinent negatives include no chest pain, headaches, malaise/fatigue, palpitations, peripheral edema or shortness of breath. There are no associated agents to hypertension. Risk factors for coronary artery disease include male gender and dyslipidemia. The current treatment provides significant improvement. Hyperlipidemia  This is a chronic problem. The current episode started more than 1 year ago. The problem is controlled. Recent lipid tests were reviewed and are normal. He has no history of diabetes or hypothyroidism. Pertinent negatives include no chest pain or shortness of breath. Current antihyperlipidemic treatment includes statins. The current treatment provides significant improvement of lipids. Risk factors for coronary artery disease include dyslipidemia, hypertension and male sex. Gastroesophageal Reflux  He reports no abdominal pain, no chest pain, no coughing, no dysphagia, no heartburn or no nausea. This is a chronic problem. The current episode started more than 1 year ago. The problem has been unchanged. Pertinent negatives include no fatigue, melena or weight loss. He has tried a PPI for the symptoms. The treatment provided significant relief. Hyperglycemia - has been feeling good.  Some elevated FSBS at 102-106 so hgba1c 5.2 today  Past Medical History:     Past Medical History:   Diagnosis Date    GERD (gastroesophageal reflux

## 2023-07-12 NOTE — PATIENT INSTRUCTIONS
Survey: You may be receiving a survey from [employer] regarding your visit today. You may get this in the mail, through your MyChart or in your email. Please complete the survey to enable us to provide the highest quality of care to you and your family. Please also, mention our names. If you cannot score us as very good (5 Stars) on any question, please feel free to call the office to discuss how we could have made your experience exceptional.      Thank You!         MD Katja Kaba LPN

## 2024-01-19 NOTE — TELEPHONE ENCOUNTER
pred 40 for 7 days then 20 for 7 days No Orientation to room/Bed in low position, brakes on/Side rails x 2 or 4 up, assess large gaps, such that a patient could get extremity or other body part entrapped, use additional safety procedures/Use of non-skid footwear for ambulating patients, use of appropriate size clothing to prevent risk of tripping/Call light is within reach, educate patient/family on its functionality

## 2024-01-20 DIAGNOSIS — I10 PRIMARY HYPERTENSION: ICD-10-CM

## 2024-01-22 RX ORDER — AMLODIPINE BESYLATE 5 MG/1
5 TABLET ORAL DAILY
Qty: 30 TABLET | Refills: 5 | Status: SHIPPED | OUTPATIENT
Start: 2024-01-22

## 2024-01-22 NOTE — TELEPHONE ENCOUNTER
Last OV 07/12/23     Requesting refill on Amlodipine thru [facility].  Rx pending     Next OV not scheduled

## 2024-03-25 RX ORDER — ROSUVASTATIN CALCIUM 20 MG/1
20 TABLET, COATED ORAL DAILY
Qty: 30 TABLET | Refills: 11 | OUTPATIENT
Start: 2024-03-25

## 2024-03-26 RX ORDER — ROSUVASTATIN CALCIUM 20 MG/1
20 TABLET, COATED ORAL DAILY
Qty: 30 TABLET | Refills: 2 | Status: SHIPPED | OUTPATIENT
Start: 2024-03-26

## 2024-03-26 NOTE — TELEPHONE ENCOUNTER
Patient asking for a new script for Rosuvastatin - patient uses [facility] - Dr Zayas asking for Dr Masterson to take over this medication

## 2024-03-26 NOTE — TELEPHONE ENCOUNTER
Tell pt his medication was sent for month and two refills and then pt is due for appt in June for yearly

## 2024-05-23 ENCOUNTER — OFFICE VISIT (OUTPATIENT)
Dept: FAMILY MEDICINE CLINIC | Age: 54
End: 2024-05-23
Payer: COMMERCIAL

## 2024-05-23 VITALS
WEIGHT: 175 LBS | OXYGEN SATURATION: 98 % | BODY MASS INDEX: 25.84 KG/M2 | SYSTOLIC BLOOD PRESSURE: 124 MMHG | HEART RATE: 64 BPM | DIASTOLIC BLOOD PRESSURE: 80 MMHG

## 2024-05-23 DIAGNOSIS — K21.9 GASTROESOPHAGEAL REFLUX DISEASE WITHOUT ESOPHAGITIS: ICD-10-CM

## 2024-05-23 DIAGNOSIS — E78.00 PURE HYPERCHOLESTEROLEMIA: Primary | ICD-10-CM

## 2024-05-23 DIAGNOSIS — I10 PRIMARY HYPERTENSION: ICD-10-CM

## 2024-05-23 PROCEDURE — 3074F SYST BP LT 130 MM HG: CPT | Performed by: FAMILY MEDICINE

## 2024-05-23 PROCEDURE — G8419 CALC BMI OUT NRM PARAM NOF/U: HCPCS | Performed by: FAMILY MEDICINE

## 2024-05-23 PROCEDURE — 1036F TOBACCO NON-USER: CPT | Performed by: FAMILY MEDICINE

## 2024-05-23 PROCEDURE — G8427 DOCREV CUR MEDS BY ELIG CLIN: HCPCS | Performed by: FAMILY MEDICINE

## 2024-05-23 PROCEDURE — 3017F COLORECTAL CA SCREEN DOC REV: CPT | Performed by: FAMILY MEDICINE

## 2024-05-23 PROCEDURE — 3079F DIAST BP 80-89 MM HG: CPT | Performed by: FAMILY MEDICINE

## 2024-05-23 PROCEDURE — 99214 OFFICE O/P EST MOD 30 MIN: CPT | Performed by: FAMILY MEDICINE

## 2024-05-23 RX ORDER — ROSUVASTATIN CALCIUM 20 MG/1
20 TABLET, COATED ORAL DAILY
Qty: 30 TABLET | Refills: 11 | Status: SHIPPED | OUTPATIENT
Start: 2024-05-23

## 2024-05-23 RX ORDER — LANSOPRAZOLE 30 MG/1
30 CAPSULE, DELAYED RELEASE ORAL DAILY
Qty: 90 CAPSULE | Refills: 3 | Status: SHIPPED | OUTPATIENT
Start: 2024-05-23

## 2024-05-23 RX ORDER — SCOLOPAMINE TRANSDERMAL SYSTEM 1 MG/1
1 PATCH, EXTENDED RELEASE TRANSDERMAL
Qty: 2 PATCH | Refills: 1 | Status: SHIPPED | OUTPATIENT
Start: 2024-05-23

## 2024-05-23 RX ORDER — AMLODIPINE BESYLATE 5 MG/1
5 TABLET ORAL DAILY
Qty: 30 TABLET | Refills: 11 | Status: SHIPPED | OUTPATIENT
Start: 2024-05-23

## 2024-05-23 SDOH — ECONOMIC STABILITY: FOOD INSECURITY: WITHIN THE PAST 12 MONTHS, YOU WORRIED THAT YOUR FOOD WOULD RUN OUT BEFORE YOU GOT MONEY TO BUY MORE.: NEVER TRUE

## 2024-05-23 SDOH — ECONOMIC STABILITY: INCOME INSECURITY: HOW HARD IS IT FOR YOU TO PAY FOR THE VERY BASICS LIKE FOOD, HOUSING, MEDICAL CARE, AND HEATING?: NOT HARD AT ALL

## 2024-05-23 SDOH — ECONOMIC STABILITY: FOOD INSECURITY: WITHIN THE PAST 12 MONTHS, THE FOOD YOU BOUGHT JUST DIDN'T LAST AND YOU DIDN'T HAVE MONEY TO GET MORE.: NEVER TRUE

## 2024-05-23 ASSESSMENT — ENCOUNTER SYMPTOMS
DIARRHEA: 0
NAUSEA: 0
VOMITING: 0
TROUBLE SWALLOWING: 0
EYE DISCHARGE: 0
CONSTIPATION: 0
BLOOD IN STOOL: 0
EYE REDNESS: 0
SHORTNESS OF BREATH: 0
ABDOMINAL PAIN: 0
COUGH: 0

## 2024-05-23 ASSESSMENT — PATIENT HEALTH QUESTIONNAIRE - PHQ9
SUM OF ALL RESPONSES TO PHQ QUESTIONS 1-9: 0
2. FEELING DOWN, DEPRESSED OR HOPELESS: NOT AT ALL
SUM OF ALL RESPONSES TO PHQ9 QUESTIONS 1 & 2: 0
1. LITTLE INTEREST OR PLEASURE IN DOING THINGS: NOT AT ALL

## 2024-05-23 NOTE — PROGRESS NOTES
Date/Time     06/16/2023 08:42 AM    K 4.4 06/16/2023 08:42 AM     06/16/2023 08:42 AM    CO2 22 06/16/2023 08:42 AM    BUN 17 06/16/2023 08:42 AM    CREATININE 0.72 06/16/2023 08:42 AM    GLUCOSE 107 06/16/2023 08:42 AM    PROT 7.3 02/11/2013 09:40 AM    BILITOT 0.5 06/16/2023 08:42 AM    ALKPHOS 115 06/16/2023 08:42 AM    AST 26 06/16/2023 08:42 AM    ALT 56 06/16/2023 08:42 AM     Lab Results   Component Value Date/Time    WBC 6.3 03/11/2023 07:41 AM    RBC 5.07 03/11/2023 07:41 AM    HGB 14.7 03/11/2023 07:41 AM    HCT 44.4 03/11/2023 07:41 AM    MCV 87.5 03/11/2023 07:41 AM    MCH 28.9 03/11/2023 07:41 AM    MCHC 33.0 03/11/2023 07:41 AM    RDW 14.2 03/11/2023 07:41 AM     03/11/2023 07:41 AM    MPV NOT REPORTED 09/03/2018 03:12 PM     Lab Results   Component Value Date/Time    TSH 2.52 03/11/2023 07:41 AM     Lab Results   Component Value Date/Time    CHOL 197 03/26/2024 03:40 PM    LDL 98 03/26/2024 03:40 PM    HDL 58 03/26/2024 03:40 PM    PSA 0.37 06/16/2023 08:44 AM    LABA1C 5.2 07/12/2023 09:18 AM          Assessment/Plan:        1. Primary hypertension  Stable on the Norvasc   - amLODIPine (NORVASC) 5 MG tablet; Take 1 tablet by mouth daily  Dispense: 30 tablet; Refill: 11    2. Gastroesophageal reflux disease without esophagitis  Stable prevacid   - lansoprazole (PREVACID) 30 MG delayed release capsule; Take 1 capsule by mouth daily  Dispense: 90 capsule; Refill: 3    3. Pure hypercholesterolemia  Stable on crestor and reviewed all recent labs    Pt will be ok to scuba dive 30-40ft next week in [place]  Also asked for scopolamine patch for Dizziness with Wheelwright rides or boat ride    [facility]ac[facility] received counseling on the following healthy behaviors: nutrition and exercise  Reviewed prior labs and health maintenance  Continue current medications, diet and exercise.  Discussed use, benefit, and side effects of prescribed medications. Barriers to medication compliance addressed.

## 2024-07-09 DIAGNOSIS — R35.1 NOCTURIA: ICD-10-CM

## 2024-07-22 ENCOUNTER — HOSPITAL ENCOUNTER (OUTPATIENT)
Age: 54
Discharge: HOME | End: 2024-07-22
Payer: COMMERCIAL

## 2024-07-22 PROCEDURE — 36415 COLL VENOUS BLD VENIPUNCTURE: CPT

## 2024-07-22 PROCEDURE — 84153 ASSAY OF PSA TOTAL: CPT

## 2024-07-23 LAB — PSA SERPL-MCNC: 0.3 NG/ML (ref 0–4)

## 2024-08-20 PROBLEM — R35.1 NOCTURIA: Status: ACTIVE | Noted: 2024-08-20

## 2024-08-20 PROBLEM — N20.0 BILATERAL RENAL STONES: Status: ACTIVE | Noted: 2024-08-20

## 2024-08-20 PROBLEM — N40.0 BENIGN PROSTATIC HYPERPLASIA WITHOUT URINARY OBSTRUCTION: Status: ACTIVE | Noted: 2024-08-20

## 2024-08-20 ASSESSMENT — ENCOUNTER SYMPTOMS
EYES NEGATIVE: 1
ALLERGIC/IMMUNOLOGIC NEGATIVE: 1
COUGH: 0
CHILLS: 0
DIFFICULTY URINATING: 0
NAUSEA: 0
FEVER: 0
PSYCHIATRIC NEGATIVE: 1
ENDOCRINE NEGATIVE: 1
SHORTNESS OF BREATH: 0

## 2024-08-20 NOTE — PROGRESS NOTES
Subjective   Patient ID: Kaiden Rome is a 54 y.o. male.    HPI  Patient is here for yearly follow up. Most recent PSA was 0.30 on 7/24. . Prior PSA was 0.37 on 6/23. Prior PSA was 0.29 on 8/22. Chronic BPH sx are mild and stable. Denies urgency and frequency. Denies dysuria. Denies hematuria. Nocturia x1. No medication for LUT'S. Hx of gross hematuria. No recent sx. Normal CT on 1/23. Hx of kidney stones. No recent sx. ED is mild. No medication needed. Energy level is good. Libido is good.       Review of Systems   Constitutional:  Negative for chills and fever.   HENT: Negative.     Eyes: Negative.    Respiratory:  Negative for cough and shortness of breath.    Cardiovascular:  Negative for chest pain and leg swelling.   Gastrointestinal:  Negative for nausea.   Endocrine: Negative.    Genitourinary:  Negative for difficulty urinating.        Negative except for documented in HPI   Allergic/Immunologic: Negative.    Neurological:         Alert & oriented X 3   Hematological:         Denies blood thinners   Psychiatric/Behavioral: Negative.         Objective   Physical Exam  Vitals and nursing note reviewed.   Constitutional:       General: He is not in acute distress.     Appearance: Normal appearance.   Pulmonary:      Effort: Pulmonary effort is normal.   Abdominal:      Tenderness: There is no abdominal tenderness.   Genitourinary:     Comments: Kidneys non palpable bilaterally  Bladder non palpable or tender  Scrotum no mass, No hydrocele  Epididymis- No spermatocele. Non Tender.  Testicles: No mass. WNL  Urethra: No discharge  Penis within normal limits... No lesions. circumcised  Prostate - symmetric, no nodules. BENIGN  Seminal Vesicals: No mass.  Sphincter tone: normal  Neurological:      Mental Status: He is alert.         Assessment/Plan   Diagnoses and all orders for this visit:  Nocturia  -     POCT UA Automated manually resulted  Bilateral renal stones  Benign prostatic hyperplasia without urinary  obstruction    All available PSA values reviewed, Options discussed. Questions answered.   Diet changes for prostate health discussed and educational information given. Pros/Cons of prostate health supplements discussed.   Treatment options for LUTS reviewed  Discussed timed voiding. Discussed fluid and caffeine intake  Treatment options for ED reviewed-Not an issue  Lifestyle change to help prevent UTIs discussed. Encouraged fluid intake.  UA reviewed  CT from 2022 reviewed-No stones  Stone prevention discussed. Diet reviewed. Discussed fluid intake      F/U 1 year with PSA

## 2024-08-22 ENCOUNTER — APPOINTMENT (OUTPATIENT)
Dept: UROLOGY | Facility: CLINIC | Age: 54
End: 2024-08-22

## 2024-08-22 VITALS
HEIGHT: 69 IN | HEART RATE: 66 BPM | SYSTOLIC BLOOD PRESSURE: 160 MMHG | DIASTOLIC BLOOD PRESSURE: 100 MMHG | WEIGHT: 177 LBS | BODY MASS INDEX: 26.22 KG/M2

## 2024-08-22 DIAGNOSIS — N40.0 BENIGN PROSTATIC HYPERPLASIA WITHOUT URINARY OBSTRUCTION: ICD-10-CM

## 2024-08-22 DIAGNOSIS — R35.1 NOCTURIA: ICD-10-CM

## 2024-08-22 DIAGNOSIS — N20.0 BILATERAL RENAL STONES: ICD-10-CM

## 2024-08-22 LAB
POC APPEARANCE, URINE: CLEAR
POC BILIRUBIN, URINE: NEGATIVE
POC BLOOD, URINE: NEGATIVE
POC COLOR, URINE: YELLOW
POC GLUCOSE, URINE: NEGATIVE MG/DL
POC KETONES, URINE: NEGATIVE MG/DL
POC LEUKOCYTES, URINE: NEGATIVE
POC NITRITE,URINE: NEGATIVE
POC PH, URINE: 7 PH
POC PROTEIN, URINE: NEGATIVE MG/DL
POC SPECIFIC GRAVITY, URINE: 1.02
POC UROBILINOGEN, URINE: 0.2 EU/DL

## 2024-08-22 PROCEDURE — 3008F BODY MASS INDEX DOCD: CPT | Performed by: UROLOGY

## 2024-08-22 PROCEDURE — 81003 URINALYSIS AUTO W/O SCOPE: CPT | Performed by: UROLOGY

## 2024-08-22 PROCEDURE — 1036F TOBACCO NON-USER: CPT | Performed by: UROLOGY

## 2024-08-22 PROCEDURE — 99214 OFFICE O/P EST MOD 30 MIN: CPT | Performed by: UROLOGY

## 2024-08-22 RX ORDER — ROSUVASTATIN CALCIUM 20 MG/1
20 TABLET, COATED ORAL DAILY
COMMUNITY

## 2024-08-22 RX ORDER — LANSOPRAZOLE 30 MG/1
1 CAPSULE, DELAYED RELEASE ORAL DAILY
COMMUNITY
Start: 2024-05-23

## 2024-08-22 RX ORDER — AMLODIPINE BESYLATE 5 MG/1
5 TABLET ORAL DAILY
COMMUNITY

## 2025-03-31 ENCOUNTER — TELEPHONE (OUTPATIENT)
Dept: FAMILY MEDICINE CLINIC | Age: 55
End: 2025-03-31

## 2025-03-31 NOTE — TELEPHONE ENCOUNTER
Please tell the pt that labs look ok except the TGs were very high -- he needs to make sure LOW carbs and sweets in diet daily

## 2025-04-01 NOTE — TELEPHONE ENCOUNTER
BETTY  Spoke with patient regarding elevated triglycerides  Patient said he did eat pasta the night before his labs.  He said at one time he was on tricor and it was stopped and he was started on rosuvastatin per   In 's note it did say he could restart tricor if repeat lipids were still elevated.   That was in 's note on 3/28/23  Patient will make his yearly check up appointment for 5/25

## 2025-06-18 ENCOUNTER — OFFICE VISIT (OUTPATIENT)
Dept: FAMILY MEDICINE CLINIC | Age: 55
End: 2025-06-18
Payer: COMMERCIAL

## 2025-06-18 VITALS
WEIGHT: 180 LBS | DIASTOLIC BLOOD PRESSURE: 80 MMHG | HEART RATE: 60 BPM | HEIGHT: 69 IN | BODY MASS INDEX: 26.66 KG/M2 | OXYGEN SATURATION: 95 % | SYSTOLIC BLOOD PRESSURE: 130 MMHG

## 2025-06-18 DIAGNOSIS — E78.00 PURE HYPERCHOLESTEROLEMIA: ICD-10-CM

## 2025-06-18 DIAGNOSIS — K21.9 GASTROESOPHAGEAL REFLUX DISEASE WITHOUT ESOPHAGITIS: Primary | ICD-10-CM

## 2025-06-18 DIAGNOSIS — I10 PRIMARY HYPERTENSION: ICD-10-CM

## 2025-06-18 PROCEDURE — 99214 OFFICE O/P EST MOD 30 MIN: CPT | Performed by: FAMILY MEDICINE

## 2025-06-18 PROCEDURE — G8427 DOCREV CUR MEDS BY ELIG CLIN: HCPCS | Performed by: FAMILY MEDICINE

## 2025-06-18 PROCEDURE — 3079F DIAST BP 80-89 MM HG: CPT | Performed by: FAMILY MEDICINE

## 2025-06-18 PROCEDURE — 1036F TOBACCO NON-USER: CPT | Performed by: FAMILY MEDICINE

## 2025-06-18 PROCEDURE — 3017F COLORECTAL CA SCREEN DOC REV: CPT | Performed by: FAMILY MEDICINE

## 2025-06-18 PROCEDURE — 3075F SYST BP GE 130 - 139MM HG: CPT | Performed by: FAMILY MEDICINE

## 2025-06-18 PROCEDURE — G8419 CALC BMI OUT NRM PARAM NOF/U: HCPCS | Performed by: FAMILY MEDICINE

## 2025-06-18 RX ORDER — FENOFIBRATE 145 MG/1
145 TABLET, FILM COATED ORAL DAILY
Qty: 30 TABLET | Refills: 11 | Status: SHIPPED | OUTPATIENT
Start: 2025-06-18

## 2025-06-18 RX ORDER — AMLODIPINE BESYLATE 5 MG/1
5 TABLET ORAL DAILY
Qty: 30 TABLET | Refills: 11 | Status: SHIPPED | OUTPATIENT
Start: 2025-06-18

## 2025-06-18 RX ORDER — LANSOPRAZOLE 30 MG/1
30 CAPSULE, DELAYED RELEASE ORAL DAILY
Qty: 90 CAPSULE | Refills: 3 | Status: SHIPPED | OUTPATIENT
Start: 2025-06-18

## 2025-06-18 RX ORDER — SCOPOLAMINE 1 MG/3D
1 PATCH, EXTENDED RELEASE TRANSDERMAL
Qty: 2 PATCH | Refills: 2 | Status: SHIPPED | OUTPATIENT
Start: 2025-06-18

## 2025-06-18 RX ORDER — ROSUVASTATIN CALCIUM 20 MG/1
20 TABLET, COATED ORAL DAILY
Qty: 30 TABLET | Refills: 11 | Status: SHIPPED | OUTPATIENT
Start: 2025-06-18

## 2025-06-18 SDOH — ECONOMIC STABILITY: FOOD INSECURITY: WITHIN THE PAST 12 MONTHS, YOU WORRIED THAT YOUR FOOD WOULD RUN OUT BEFORE YOU GOT MONEY TO BUY MORE.: NEVER TRUE

## 2025-06-18 SDOH — ECONOMIC STABILITY: FOOD INSECURITY: WITHIN THE PAST 12 MONTHS, THE FOOD YOU BOUGHT JUST DIDN'T LAST AND YOU DIDN'T HAVE MONEY TO GET MORE.: NEVER TRUE

## 2025-06-18 ASSESSMENT — ENCOUNTER SYMPTOMS
EYE REDNESS: 0
DIARRHEA: 0
TROUBLE SWALLOWING: 0
EYE DISCHARGE: 0
ABDOMINAL PAIN: 0
SHORTNESS OF BREATH: 0
NAUSEA: 0
COUGH: 0
BLOOD IN STOOL: 0
GLOBUS SENSATION: 0
CHOKING: 0
VOMITING: 0

## 2025-06-18 ASSESSMENT — PATIENT HEALTH QUESTIONNAIRE - PHQ9
SUM OF ALL RESPONSES TO PHQ QUESTIONS 1-9: 0
1. LITTLE INTEREST OR PLEASURE IN DOING THINGS: NOT AT ALL
2. FEELING DOWN, DEPRESSED OR HOPELESS: NOT AT ALL
SUM OF ALL RESPONSES TO PHQ QUESTIONS 1-9: 0

## 2025-06-18 NOTE — PATIENT INSTRUCTIONS
SURVEY:    You may be receiving a survey from [employer] regarding your visit today.    Please complete the survey to enable us to provide the highest quality of care to you and your family.      Thank you.    Clinical Care Team: MD Ishmael Colin LPN              Triage: Hoa Barrientos CMA              Clerical Team: Hoa Rader

## 2025-06-18 NOTE — PROGRESS NOTES
HPI Notes    Name: Zacarias Mcmahan  : 1970        Chief Complaint:     Chief Complaint   Patient presents with    Gastroesophageal Reflux    Hypertension    Hyperlipidemia       History of Present Illness:     Zacarias Mcmahan is a 55 y.o.  male who presents with Gastroesophageal Reflux, Hypertension, and Hyperlipidemia      Gastroesophageal Reflux  He reports no abdominal pain, no chest pain, no choking, no coughing, no globus sensation or no nausea. This is a chronic problem. The current episode started more than 1 year ago. The problem has been unchanged. The symptoms are aggravated by certain foods. Pertinent negatives include no fatigue, melena or weight loss. He has tried a PPI for the symptoms. The treatment provided significant relief.   Hypertension  This is a chronic problem. The current episode started more than 1 year ago. The problem is unchanged. The problem is controlled. Pertinent negatives include no anxiety, chest pain, headaches, palpitations or shortness of breath. There are no associated agents to hypertension. Risk factors for coronary artery disease include dyslipidemia and male gender. The current treatment provides significant improvement.   Hyperlipidemia  This is a chronic problem. The current episode started more than 1 year ago. The problem is controlled. Recent lipid tests were reviewed and are high (elevated TGs but pt has been off the Tricor for over one year). Pertinent negatives include no chest pain or shortness of breath. Treatments tried: pt used to be on the tricor. The current treatment provides significant improvement of lipids. Risk factors for coronary artery disease include dyslipidemia, hypertension and male sex.       Past Medical History:     Past Medical History:   Diagnosis Date    GERD (gastroesophageal reflux disease)     Hyperlipidemia     Kidney stones       Reviewed all health maintenance requirements and ordered appropriate tests  Health

## 2025-08-19 ENCOUNTER — HOSPITAL ENCOUNTER (OUTPATIENT)
Dept: LAB | Age: 55
Discharge: HOME | End: 2025-08-19
Payer: COMMERCIAL

## 2025-08-19 LAB — PSA SERPL-MCNC: 0.25 NG/ML (ref 0–4)

## 2025-08-19 PROCEDURE — 84153 ASSAY OF PSA TOTAL: CPT

## 2025-08-19 PROCEDURE — 36415 COLL VENOUS BLD VENIPUNCTURE: CPT

## 2025-09-22 ENCOUNTER — APPOINTMENT (OUTPATIENT)
Dept: UROLOGY | Facility: CLINIC | Age: 55
End: 2025-09-22
Payer: COMMERCIAL

## (undated) DEVICE — 1200CC SUCTION CANISTER WITH HYDROPHOBIC FILTER AND RED LID: Brand: BEMIS

## (undated) DEVICE — ELECTRODE ES AD CRD L15FT DISP FOR PT BELOW 30LB REM

## (undated) DEVICE — GOWN,AURORA,NONRNF,XL,30/CS: Brand: MEDLINE

## (undated) DEVICE — REAGENT TEST UREASE RAPD CLOTEST F/

## (undated) DEVICE — JELLY LUBRICATING 4OZ FLIP TOP TB E Z

## (undated) DEVICE — MEDI-VAC NON-CONDUCTIVE SUCTION TUBING 6MM X 6.1M (20 FT.) L: Brand: CARDINAL HEALTH

## (undated) DEVICE — BLOCK BITE AD OPN SZ 48FR MOUTHPC ENDOSCP STURDY W/ FOAM

## (undated) DEVICE — FORCEP SPEC RETRV BX AD 2 MMX155 CM 5 MM GI OVL CUP W/ NDL

## (undated) DEVICE — MERCY HEALTH ST CHARLES: Brand: MEDLINE INDUSTRIES, INC.

## (undated) DEVICE — FORCEPS BX L240CM JAW DIA2.2MM RAD JAW 4 HOT DISP